# Patient Record
Sex: FEMALE | NOT HISPANIC OR LATINO | URBAN - METROPOLITAN AREA
[De-identification: names, ages, dates, MRNs, and addresses within clinical notes are randomized per-mention and may not be internally consistent; named-entity substitution may affect disease eponyms.]

---

## 2017-01-02 ENCOUNTER — HOSPITAL ENCOUNTER (OUTPATIENT)
Dept: LAB | Facility: CLINIC | Age: 32
End: 2017-01-02
Attending: PHYSICIAN ASSISTANT

## 2017-01-02 ENCOUNTER — TELEPHONE (OUTPATIENT)
Dept: SURGERY | Facility: CLINIC | Age: 32
End: 2017-01-02

## 2017-01-02 NOTE — TELEPHONE ENCOUNTER
Lab at [facility] called stating patient is there for labs and no orders seen.  This writer sees no orders either.  Informed lab that the provider is in with patients and won't be available for a couple of hours.  Apologized - we will contact patient re: labs that may need to be done.  Lab gave message to patient.  Latha Burgess, MS, RD, RN

## 2017-01-03 ENCOUNTER — MYC MEDICAL ADVICE (OUTPATIENT)
Dept: SURGERY | Facility: CLINIC | Age: 32
End: 2017-01-03

## 2017-01-11 ENCOUNTER — OFFICE VISIT (OUTPATIENT)
Dept: SURGERY | Facility: CLINIC | Age: 32
End: 2017-01-11
Payer: COMMERCIAL

## 2017-01-11 DIAGNOSIS — E66.01 MORBID OBESITY, UNSPECIFIED OBESITY TYPE (H): Primary | ICD-10-CM

## 2017-01-11 PROCEDURE — 97803 MED NUTRITION INDIV SUBSEQ: CPT | Performed by: DIETITIAN, REGISTERED

## 2017-01-11 NOTE — PROGRESS NOTES
PRE-SURGICAL WEIGHT LOSS NUTRITION APPOINTMENT  DATE OF VISIT: 2017  Name: YOSEPH CRISTOBAL  : 1985  Gender: Female  MRN: [id number]  Age: 31  ASSESSMENT  REASON FOR VISIT:  YOSEPH CRISTOBAL is a 31 year old Female presents today for a pre-surgical weight loss follow-up appointment.  DIAGNOSIS:  Class III Morbid Obesity.    ANTHROPOMETRICS:  Height: 64.5 inches  Initial Weight: 260 lbs  Weight last visit: 289.7 lbs  Current Weight: 283.0 lbs  BMI: 47.8 kg/m2    NUTRITION HISTORY:  Breakfast: protein shake (24 g protein)-within 1 hour of waking  Lunch: spinach salad with chicken and assorted veggies @ 11:00-2:00   Supper: Chicken noodle soup (when sick with a cold) or chicken, green beans or broccoli @ 6:00-7:00  Snacks: No  Drinks: water, Crystal Light, protein drink  Consuming liquid calories: Protein supplements/shakes  Eating 3 meals per day: Yes  Eating slower: Yes  Chewing foods thoroughly: Yes  Take 30 minutes to consume each meal: Yes  Fluids and meals separate by at least 30 minutes: Yes  Comments: Patient feels like exercise was most helpful with weight loss this past month; patient is confident she can reduce her weight at least one more pound to get to her pre-op weight loss goal  Desired Surgical Procedure: gastric bypass  PHYSICAL ACTIVITY:  Type: Treadmill;weight training  Frequency: 3-4x/week  Duration (min): 40  DIAGNOSIS:  Previous Nutrition Diagnosis: Obesity related to excess energy intake as evidenced by BMI of 49.0  no change  Previous goals:  1. Restart exercise program - go to gym 3x/week for 40 minutes-met  2. Be mindful of portion sizes at lunchtime meals-met  Current Nutrition Diagnosis: Obesity related to excess energy intake as evidenced by BMI of 47.8  IMPLEMENTATION:  Nutrition Prescription: Recommended energy/nutrient modification  Goals:  Continue to practice all prebariatric surgery diet guidelines  Verify that calcium is 600 mg (continue to take BID if 600 mg) Continue  with current exercise regimen   Implementation:  - Discussed progress towards previous goals  - Reinforced importance of making behavior changes in preparation for bariatric surgery.  NUTRITION MONITORING AND EVALUATION:  Anticipated compliance: fair-good  Patient verbalized good understanding of bariatric diet guidelines.  Patient has met prebariatric surgery diet requirements.  Follow up:  # of visits needed: 0  Cleared by RD: Yes  TIME SPENT WITH PATIENT: 23 minutes  Alessandro Olson RD, LD  [facility]  [phone]

## 2017-02-03 ENCOUNTER — TELEPHONE (OUTPATIENT)
Dept: SURGERY | Facility: CLINIC | Age: 32
End: 2017-02-03

## 2017-02-14 ENCOUNTER — OFFICE VISIT (OUTPATIENT)
Dept: SURGERY | Facility: CLINIC | Age: 32
End: 2017-02-14
Payer: COMMERCIAL

## 2017-02-14 VITALS
WEIGHT: 265.2 LBS | SYSTOLIC BLOOD PRESSURE: 134 MMHG | HEART RATE: 87 BPM | BODY MASS INDEX: 44.82 KG/M2 | DIASTOLIC BLOOD PRESSURE: 88 MMHG

## 2017-02-14 PROCEDURE — 99215 OFFICE O/P EST HI 40 MIN: CPT | Performed by: SURGERY

## 2017-02-14 NOTE — PROGRESS NOTES
2017  RE: YOSEPH CRISTOBAL  : 1985             Dear LINDA PAN MD ,       I had the distinct pleasure of meeting with your patient, YOSEPH CRISTOBAL, in the [facility].  As you may know, YOSEPH has been undergoing the thorough preoperative screening process in anticipation of potential bariatric surgery.      At present your patient's BMI is 44.8, and weight is 265  lbs. YOSEPH suffers from multiple medical comorbidities, some of them related to morbid obesity. The comorbidities include High blood pressure, Diabetes type II, Plantar fasciitis, Low back pain, Degenerative disk disease (DDD), Migraine headaches, Obstructive sleep apnea, Skin fold rashes.      YOSEPH has tried multiple attempts at weight loss including Calorie Counting.      PHYSICAL EXAMINATION:    Vital Signs: Ht: 64.5 in, Wt: 265  lbs., BMI: 44.8, BP: 119/78 , Pulse: 85, Temp: 99.1     GENERAL: Alert and oriented x3. NAD  HEENT:  adequate hearing and nasal air movement, Oral dentition adequate for chewing  CARDIOVASCULAR: No JVD  RESPIRATORY: Breathing unlabored  GASTROINTESTINAL: Obese  LOWER EXTREMITIES: no deformities  MUSCULOSKELETAL: Normal gait, Moves all 4 extremities equal and strong  NEUROLOGIC: no gross defect  SKIN: warm and dry to touch      In summary, YOSEPH CRISTOBAL has been evaluated by our medical provider, dietitian and psychologist and appears to be an appropriate candidate for the procedure.      In the office today, I discussed the Laparoscopic Anamaria-en-Y Gastric Bypass as well as the potential risks and benefits. I do feel that some of the related comorbidities can be improved through weight loss, and that surgical weight loss may be the best option. At present we are going to present your patient's file for prior authorization to the insurance provider. Pending prior authorization, I anticipate a surgical date in the near future.      If you have any questions regarding  your patient's care, please do not hesitate to contact me. I look forward to keeping you updated on CRYSTAL's progress through our clinic.      Sincerely,      Dr. Benson Chavez                    Total encopunter time 60 minutes, more than half spent in counseling, review of data and coordination of care

## 2017-02-14 NOTE — LETTER
[facility]          [address]  [address]  [place] [address]                                         Tel:  [phone]  Fax: [phone]        2017    RE: YOSEPH CRISTOBAL  : 1985            Dear LINDA PAN MD ,         I had the distinct pleasure of meeting with your patient, YOSEPH CRISTOBAL, in the [facility].  As you may know, YOSEPH has been undergoing the thorough preoperative screening process in anticipation of potential bariatric surgery.      At present your patient's BMI is 44.8, and weight is 265  lbs. YOSEPH suffers from multiple medical comorbidities, some of them related to morbid obesity. The comorbidities include High blood pressure, Diabetes type II, Plantar fasciitis, Low back pain, Degenerative disk disease (DDD), Migraine headaches, Obstructive sleep apnea, Skin fold rashes.      YOSEPH has tried multiple attempts at weight loss including Calorie Counting.     PHYSICAL EXAMINATION:     Vital Signs: Ht: 64.5 in, Wt: 265  lbs., BMI: 44.8, BP: 119/78 , Pulse: 85, Temp: 99.1      GENERAL: Alert and oriented x3. NAD  HEENT:  adequate hearing and nasal air movement, Oral dentition adequate for chewing  CARDIOVASCULAR: No JVD  RESPIRATORY: Breathing unlabored  GASTROINTESTINAL: Obese  LOWER EXTREMITIES: no deformities  MUSCULOSKELETAL: Normal gait, Moves all 4 extremities equal and strong  NEUROLOGIC: no gross defect  SKIN: warm and dry to touch      In summary, YOSEPH CRISTOBAL has been evaluated by our medical provider, dietitian and psychologist and appears to be an appropriate candidate for the procedure.     In the office today, I discussed the Laparoscopic Anamaria-en-Y Gastric Bypass as well as the potential risks and benefits. I do feel that some of the related comorbidities can be improved through weight loss, and that surgical weight loss may be the best option. At present we are going to present  your patient's file for prior authorization to the insurance provider. Pending prior authorization, I anticipate a surgical date in the near future.      If you have any questions regarding your patient's care, please do not hesitate to contact me. I look forward to keeping you updated on CRYSTAL's progress through our clinic.      Sincerely,      Dr. Benson Chavez

## 2017-02-14 NOTE — MR AVS SNAPSHOT
After Visit Summary   2/14/2017    Saundra Franks    MRN: [id number]           Patient Information     Date Of Birth          1985        Visit Information        Provider Department      2/14/2017 10:30 AM Benson Chavez MD [facility]      Today's Diagnoses     BMI 40.0-44.9, adult (H)    -  1       Follow-ups after your visit        Who to contact     If you have questions or need follow up information about today's clinic visit or your schedule please contact [facility] directly at [phone].  Normal or non-critical lab and imaging results will be communicated to you by MyChart, letter or phone within 4 business days after the clinic has received the results. If you do not hear from us within 7 days, please contact the clinic through [employer] or phone. If you have a critical or abnormal lab result, we will notify you by phone as soon as possible.  Submit refill requests through [employer] or call your pharmacy and they will forward the refill request to us. Please allow 3 business days for your refill to be completed.          Additional Information About Your Visit        MyChart Information     [employer] gives you secure access to your electronic health record. If you see a primary care provider, you can also send messages to your care team and make appointments. If you have questions, please call your primary care clinic.  If you do not have a primary care provider, please call [phone] and they will assist you.        Care EveryWhere ID     This is your Care EveryWhere ID. This could be used by other organizations to access your [facility] medical records  [id number]        Your Vitals Were     Pulse Last Period BMI (Body Mass Index)             87 (LMP Unknown) 44.82 kg/m2          Blood Pressure from Last 3 Encounters:   02/14/17 134/88   12/12/16 114/73   11/15/16 119/78  "   Weight from Last 3 Encounters:   02/14/17 265 lb 3.2 oz (120.3 kg)   12/13/16 289 lb 11.2 oz (131.4 kg)   12/12/16 287 lb (130.2 kg)              We Performed the Following     OP ROOMING NOTE TO AMADO        Primary Care Provider Office Phone # Fax #    Paulo Martin [phone]       [address] [address]        Thank you!     Thank you for choosing [facility]  for your care. Our goal is always to provide you with excellent care. Hearing back from our patients is one way we can continue to improve our services. Please take a few minutes to complete the written survey that you may receive in the mail after your visit with us. Thank you!             Your Updated Medication List - Protect others around you: Learn how to safely use, store and throw away your medicines at www.disposemymeds.org.          This list is accurate as of: 2/14/17  5:03 PM.  Always use your most recent med list.                   Brand Name Dispense Instructions for use    B-D U/F 31G X 8 MM   Generic drug:  insulin pen needle          DULoxetine 60 MG EC capsule    CYMBALTA     60 mg daily       FROVA 2.5 MG tablet   Generic drug:  frovatriptan      Take 2.5 mg by mouth       gabapentin 400 MG capsule    NEURONTIN     400 mg 3 times daily       ibuprofen 800 MG tablet    ADVIL/MOTRIN    60 tablet    Take 1 tablet (800 mg) by mouth every 8 hours as needed for moderate pain       insulin syringe-needle U-100 30G X 5/16\" 1 ML          losartan 50 MG tablet    COZAAR     100 mg daily       metFORMIN 1000 MG tablet    GLUCOPHAGE     1,000 mg 2 times daily (with meals)       Multi-vitamin Tabs tablet      Take 2 tablets by mouth daily Herbalife       NovoLOG MIX 70/30 FLEXPEN injection   Generic drug:  insulin aspart prot & aspart      Inject 63 Units Subcutaneous 2 times daily (with meals)       ONE TOUCH LANCETS Misc          ONE TOUCH ULTRA test " strip   Generic drug:  blood glucose monitoring          verapamil 120 MG 24 hr capsule    VERELAN     Take 120 mg by mouth       XANAX PO      Take 0.5 mg by mouth as needed for anxiety

## 2017-03-23 ENCOUNTER — APPOINTMENT (OUTPATIENT)
Dept: SURGERY | Facility: PHYSICIAN GROUP | Age: 32
End: 2017-03-23
Payer: COMMERCIAL

## 2017-03-23 ENCOUNTER — ANESTHESIA EVENT (OUTPATIENT)
Dept: SURGERY | Facility: CLINIC | Age: 32
DRG: 621 | End: 2017-03-23
Payer: COMMERCIAL

## 2017-03-23 ENCOUNTER — HOSPITAL ENCOUNTER (INPATIENT)
Facility: CLINIC | Age: 32
LOS: 3 days | Discharge: HOME | DRG: 621 | End: 2017-03-26
Attending: SURGERY | Admitting: SURGERY
Payer: COMMERCIAL

## 2017-03-23 ENCOUNTER — ANESTHESIA (OUTPATIENT)
Dept: SURGERY | Facility: CLINIC | Age: 32
DRG: 621 | End: 2017-03-23
Payer: COMMERCIAL

## 2017-03-23 DIAGNOSIS — Z98.84 BARIATRIC SURGERY STATUS: ICD-10-CM

## 2017-03-23 DIAGNOSIS — G89.18 ACUTE POST-OPERATIVE PAIN: ICD-10-CM

## 2017-03-23 DIAGNOSIS — E66.01 MORBID OBESITY DUE TO EXCESS CALORIES (H): Primary | ICD-10-CM

## 2017-03-23 DIAGNOSIS — R11.2 PONV (POSTOPERATIVE NAUSEA AND VOMITING): ICD-10-CM

## 2017-03-23 DIAGNOSIS — Z98.890 PONV (POSTOPERATIVE NAUSEA AND VOMITING): ICD-10-CM

## 2017-03-23 LAB
CREAT SERPL-MCNC: 0.71 MG/DL (ref 0.52–1.04)
CREAT SERPL-MCNC: 0.77 MG/DL (ref 0.52–1.04)
GFR SERPL CREATININE-BSD FRML MDRD: 87 ML/MIN/1.7M2
GFR SERPL CREATININE-BSD FRML MDRD: NORMAL ML/MIN/1.7M2
GLUCOSE BLDC GLUCOMTR-MCNC: 166 MG/DL (ref 70–99)
GLUCOSE BLDC GLUCOMTR-MCNC: 170 MG/DL (ref 70–99)
GLUCOSE BLDC GLUCOMTR-MCNC: 175 MG/DL (ref 70–99)
GLUCOSE SERPL-MCNC: 152 MG/DL (ref 70–99)
HCG SERPL QL: NEGATIVE
PLATELET # BLD AUTO: 279 10E9/L (ref 150–450)
POTASSIUM SERPL-SCNC: 4 MMOL/L (ref 3.4–5.3)

## 2017-03-23 PROCEDURE — 00000146 ZZHCL STATISTIC GLUCOSE BY METER IP

## 2017-03-23 PROCEDURE — 25000128 H RX IP 250 OP 636: Performed by: NURSE ANESTHETIST, CERTIFIED REGISTERED

## 2017-03-23 PROCEDURE — 25800025 ZZH RX 258: Performed by: PHYSICIAN ASSISTANT

## 2017-03-23 PROCEDURE — 85049 AUTOMATED PLATELET COUNT: CPT | Performed by: PHYSICIAN ASSISTANT

## 2017-03-23 PROCEDURE — 43644 LAP GASTRIC BYPASS/ROUX-EN-Y: CPT | Mod: AS | Performed by: PHYSICIAN ASSISTANT

## 2017-03-23 PROCEDURE — 0D164ZA BYPASS STOMACH TO JEJUNUM, PERCUTANEOUS ENDOSCOPIC APPROACH: ICD-10-PCS | Performed by: SURGERY

## 2017-03-23 PROCEDURE — 25000564 ZZH DESFLURANE, EA 15 MIN: Performed by: SURGERY

## 2017-03-23 PROCEDURE — 25000128 H RX IP 250 OP 636: Performed by: PHYSICIAN ASSISTANT

## 2017-03-23 PROCEDURE — 25000128 H RX IP 250 OP 636: Performed by: ANESTHESIOLOGY

## 2017-03-23 PROCEDURE — 84703 CHORIONIC GONADOTROPIN ASSAY: CPT | Performed by: ANESTHESIOLOGY

## 2017-03-23 PROCEDURE — 82565 ASSAY OF CREATININE: CPT | Performed by: PHYSICIAN ASSISTANT

## 2017-03-23 PROCEDURE — 82565 ASSAY OF CREATININE: CPT | Performed by: ANESTHESIOLOGY

## 2017-03-23 PROCEDURE — 27210995 ZZH RX 272: Performed by: SURGERY

## 2017-03-23 PROCEDURE — 82947 ASSAY GLUCOSE BLOOD QUANT: CPT | Performed by: ANESTHESIOLOGY

## 2017-03-23 PROCEDURE — 36415 COLL VENOUS BLD VENIPUNCTURE: CPT | Performed by: PHYSICIAN ASSISTANT

## 2017-03-23 PROCEDURE — 43644 LAP GASTRIC BYPASS/ROUX-EN-Y: CPT | Performed by: SURGERY

## 2017-03-23 PROCEDURE — 25800025 ZZH RX 258: Performed by: ANESTHESIOLOGY

## 2017-03-23 PROCEDURE — 25000125 ZZHC RX 250: Performed by: ANESTHESIOLOGY

## 2017-03-23 PROCEDURE — 25000125 ZZHC RX 250: Performed by: SURGERY

## 2017-03-23 PROCEDURE — 25800025 ZZH RX 258: Performed by: SURGERY

## 2017-03-23 PROCEDURE — 36000063 ZZH SURGERY LEVEL 4 EA 15 ADDTL MIN: Performed by: SURGERY

## 2017-03-23 PROCEDURE — 27211024 ZZHC OR SUPPLY OTHER OPNP: Performed by: SURGERY

## 2017-03-23 PROCEDURE — 71000012 ZZH RECOVERY PHASE 1 LEVEL 1 FIRST HR: Performed by: SURGERY

## 2017-03-23 PROCEDURE — 37000008 ZZH ANESTHESIA TECHNICAL FEE, 1ST 30 MIN: Performed by: SURGERY

## 2017-03-23 PROCEDURE — 27210794 ZZH OR GENERAL SUPPLY STERILE: Performed by: SURGERY

## 2017-03-23 PROCEDURE — 25000128 H RX IP 250 OP 636: Performed by: SURGERY

## 2017-03-23 PROCEDURE — 12000007 ZZH R&B INTERMEDIATE

## 2017-03-23 PROCEDURE — 37000009 ZZH ANESTHESIA TECHNICAL FEE, EACH ADDTL 15 MIN: Performed by: SURGERY

## 2017-03-23 PROCEDURE — 25000131 ZZH RX MED GY IP 250 OP 636 PS 637: Performed by: PHYSICIAN ASSISTANT

## 2017-03-23 PROCEDURE — 40000169 ZZH STATISTIC PRE-PROCEDURE ASSESSMENT I: Performed by: SURGERY

## 2017-03-23 PROCEDURE — 25000125 ZZHC RX 250: Performed by: NURSE ANESTHETIST, CERTIFIED REGISTERED

## 2017-03-23 PROCEDURE — 36415 COLL VENOUS BLD VENIPUNCTURE: CPT | Performed by: ANESTHESIOLOGY

## 2017-03-23 PROCEDURE — 84132 ASSAY OF SERUM POTASSIUM: CPT | Performed by: ANESTHESIOLOGY

## 2017-03-23 PROCEDURE — 36000093 ZZH SURGERY LEVEL 4 1ST 30 MIN: Performed by: SURGERY

## 2017-03-23 PROCEDURE — 71000013 ZZH RECOVERY PHASE 1 LEVEL 1 EA ADDTL HR: Performed by: SURGERY

## 2017-03-23 RX ORDER — HEPARIN SODIUM 5000 [USP'U]/.5ML
5000 INJECTION, SOLUTION INTRAVENOUS; SUBCUTANEOUS
Status: COMPLETED | OUTPATIENT
Start: 2017-03-23 | End: 2017-03-23

## 2017-03-23 RX ORDER — DEXTROSE MONOHYDRATE 25 G/50ML
25-50 INJECTION, SOLUTION INTRAVENOUS
Status: DISCONTINUED | OUTPATIENT
Start: 2017-03-23 | End: 2017-03-26 | Stop reason: HOSPADM

## 2017-03-23 RX ORDER — PROPOFOL 10 MG/ML
INJECTION, EMULSION INTRAVENOUS CONTINUOUS PRN
Status: DISCONTINUED | OUTPATIENT
Start: 2017-03-23 | End: 2017-03-23

## 2017-03-23 RX ORDER — BUSPIRONE HYDROCHLORIDE 10 MG/1
10 TABLET ORAL 2 TIMES DAILY
Status: DISCONTINUED | OUTPATIENT
Start: 2017-03-23 | End: 2017-03-23

## 2017-03-23 RX ORDER — GLYCOPYRROLATE 0.2 MG/ML
INJECTION, SOLUTION INTRAMUSCULAR; INTRAVENOUS PRN
Status: DISCONTINUED | OUTPATIENT
Start: 2017-03-23 | End: 2017-03-23

## 2017-03-23 RX ORDER — MAGNESIUM HYDROXIDE 1200 MG/15ML
LIQUID ORAL PRN
Status: DISCONTINUED | OUTPATIENT
Start: 2017-03-23 | End: 2017-03-23 | Stop reason: HOSPADM

## 2017-03-23 RX ORDER — LOSARTAN POTASSIUM 100 MG/1
100 TABLET ORAL EVERY EVENING
Status: DISCONTINUED | OUTPATIENT
Start: 2017-03-24 | End: 2017-03-26 | Stop reason: HOSPADM

## 2017-03-23 RX ORDER — ACETAMINOPHEN 325 MG/1
975 TABLET ORAL EVERY 8 HOURS
Status: DISCONTINUED | OUTPATIENT
Start: 2017-03-24 | End: 2017-03-26 | Stop reason: HOSPADM

## 2017-03-23 RX ORDER — HYDROXYZINE HCL 10 MG/5 ML
25 SOLUTION, ORAL ORAL EVERY 6 HOURS PRN
Status: DISCONTINUED | OUTPATIENT
Start: 2017-03-23 | End: 2017-03-26 | Stop reason: HOSPADM

## 2017-03-23 RX ORDER — ONDANSETRON 2 MG/ML
4 INJECTION INTRAMUSCULAR; INTRAVENOUS EVERY 6 HOURS PRN
Status: DISCONTINUED | OUTPATIENT
Start: 2017-03-23 | End: 2017-03-26 | Stop reason: HOSPADM

## 2017-03-23 RX ORDER — PROCHLORPERAZINE MALEATE 5 MG
5-10 TABLET ORAL EVERY 6 HOURS PRN
Status: DISCONTINUED | OUTPATIENT
Start: 2017-03-23 | End: 2017-03-26 | Stop reason: HOSPADM

## 2017-03-23 RX ORDER — SUMATRIPTAN 50 MG/1
100 TABLET, FILM COATED ORAL DAILY PRN
Status: DISCONTINUED | OUTPATIENT
Start: 2017-03-23 | End: 2017-03-26 | Stop reason: HOSPADM

## 2017-03-23 RX ORDER — CEFAZOLIN SODIUM 1 G/50ML
3 SOLUTION INTRAVENOUS
Status: COMPLETED | OUTPATIENT
Start: 2017-03-23 | End: 2017-03-23

## 2017-03-23 RX ORDER — FENTANYL CITRATE 50 UG/ML
INJECTION, SOLUTION INTRAMUSCULAR; INTRAVENOUS PRN
Status: DISCONTINUED | OUTPATIENT
Start: 2017-03-23 | End: 2017-03-23

## 2017-03-23 RX ORDER — SODIUM CHLORIDE, SODIUM LACTATE, POTASSIUM CHLORIDE, CALCIUM CHLORIDE 600; 310; 30; 20 MG/100ML; MG/100ML; MG/100ML; MG/100ML
INJECTION, SOLUTION INTRAVENOUS CONTINUOUS
Status: DISCONTINUED | OUTPATIENT
Start: 2017-03-23 | End: 2017-03-25

## 2017-03-23 RX ORDER — NEOSTIGMINE METHYLSULFATE 1 MG/ML
VIAL (ML) INJECTION PRN
Status: DISCONTINUED | OUTPATIENT
Start: 2017-03-23 | End: 2017-03-23

## 2017-03-23 RX ORDER — FENTANYL CITRATE 0.05 MG/ML
25-50 INJECTION, SOLUTION INTRAMUSCULAR; INTRAVENOUS
Status: DISCONTINUED | OUTPATIENT
Start: 2017-03-23 | End: 2017-03-23 | Stop reason: HOSPADM

## 2017-03-23 RX ORDER — PROPOFOL 10 MG/ML
INJECTION, EMULSION INTRAVENOUS PRN
Status: DISCONTINUED | OUTPATIENT
Start: 2017-03-23 | End: 2017-03-23

## 2017-03-23 RX ORDER — HYDROXYZINE HYDROCHLORIDE 50 MG/ML
25-50 INJECTION, SOLUTION INTRAMUSCULAR EVERY 6 HOURS PRN
Status: DISCONTINUED | OUTPATIENT
Start: 2017-03-23 | End: 2017-03-26 | Stop reason: HOSPADM

## 2017-03-23 RX ORDER — GABAPENTIN 100 MG/1
400 CAPSULE ORAL 3 TIMES DAILY
Status: DISCONTINUED | OUTPATIENT
Start: 2017-03-24 | End: 2017-03-26 | Stop reason: HOSPADM

## 2017-03-23 RX ORDER — SODIUM CHLORIDE, SODIUM LACTATE, POTASSIUM CHLORIDE, CALCIUM CHLORIDE 600; 310; 30; 20 MG/100ML; MG/100ML; MG/100ML; MG/100ML
INJECTION, SOLUTION INTRAVENOUS CONTINUOUS
Status: DISCONTINUED | OUTPATIENT
Start: 2017-03-23 | End: 2017-03-23 | Stop reason: HOSPADM

## 2017-03-23 RX ORDER — VECURONIUM BROMIDE 1 MG/ML
INJECTION, POWDER, LYOPHILIZED, FOR SOLUTION INTRAVENOUS PRN
Status: DISCONTINUED | OUTPATIENT
Start: 2017-03-23 | End: 2017-03-23

## 2017-03-23 RX ORDER — BUSPIRONE HYDROCHLORIDE 10 MG/1
10 TABLET ORAL 2 TIMES DAILY
Status: DISCONTINUED | OUTPATIENT
Start: 2017-03-24 | End: 2017-03-26 | Stop reason: HOSPADM

## 2017-03-23 RX ORDER — ONDANSETRON 2 MG/ML
4 INJECTION INTRAMUSCULAR; INTRAVENOUS EVERY 30 MIN PRN
Status: DISCONTINUED | OUTPATIENT
Start: 2017-03-23 | End: 2017-03-23 | Stop reason: HOSPADM

## 2017-03-23 RX ORDER — ONDANSETRON 4 MG/1
4 TABLET, ORALLY DISINTEGRATING ORAL EVERY 30 MIN PRN
Status: DISCONTINUED | OUTPATIENT
Start: 2017-03-23 | End: 2017-03-23 | Stop reason: HOSPADM

## 2017-03-23 RX ORDER — OXYCODONE HCL 5 MG/5 ML
5-10 SOLUTION, ORAL ORAL
Status: DISCONTINUED | OUTPATIENT
Start: 2017-03-23 | End: 2017-03-26 | Stop reason: HOSPADM

## 2017-03-23 RX ORDER — ACETAMINOPHEN 325 MG/1
650 TABLET ORAL EVERY 4 HOURS PRN
Status: DISCONTINUED | OUTPATIENT
Start: 2017-03-27 | End: 2017-03-26 | Stop reason: HOSPADM

## 2017-03-23 RX ORDER — NICOTINE POLACRILEX 4 MG
15-30 LOZENGE BUCCAL
Status: DISCONTINUED | OUTPATIENT
Start: 2017-03-23 | End: 2017-03-26 | Stop reason: HOSPADM

## 2017-03-23 RX ORDER — LABETALOL HYDROCHLORIDE 5 MG/ML
INJECTION, SOLUTION INTRAVENOUS PRN
Status: DISCONTINUED | OUTPATIENT
Start: 2017-03-23 | End: 2017-03-23

## 2017-03-23 RX ORDER — DIPHENHYDRAMINE HCL 25 MG
25 CAPSULE ORAL EVERY 6 HOURS PRN
Status: DISCONTINUED | OUTPATIENT
Start: 2017-03-23 | End: 2017-03-24

## 2017-03-23 RX ORDER — LIDOCAINE 40 MG/G
CREAM TOPICAL
Status: DISCONTINUED | OUTPATIENT
Start: 2017-03-23 | End: 2017-03-26 | Stop reason: HOSPADM

## 2017-03-23 RX ORDER — DIPHENHYDRAMINE HYDROCHLORIDE 50 MG/ML
25 INJECTION INTRAMUSCULAR; INTRAVENOUS EVERY 6 HOURS PRN
Status: DISCONTINUED | OUTPATIENT
Start: 2017-03-23 | End: 2017-03-24

## 2017-03-23 RX ORDER — DULOXETIN HYDROCHLORIDE 20 MG/1
60 CAPSULE, DELAYED RELEASE ORAL DAILY
Status: DISCONTINUED | OUTPATIENT
Start: 2017-03-24 | End: 2017-03-26 | Stop reason: HOSPADM

## 2017-03-23 RX ORDER — CEFAZOLIN SODIUM 2 G/100ML
2 INJECTION, SOLUTION INTRAVENOUS EVERY 8 HOURS
Status: COMPLETED | OUTPATIENT
Start: 2017-03-23 | End: 2017-03-24

## 2017-03-23 RX ORDER — LABETALOL HYDROCHLORIDE 5 MG/ML
10 INJECTION, SOLUTION INTRAVENOUS ONCE
Status: COMPLETED | OUTPATIENT
Start: 2017-03-23 | End: 2017-03-23

## 2017-03-23 RX ORDER — ONDANSETRON 2 MG/ML
INJECTION INTRAMUSCULAR; INTRAVENOUS PRN
Status: DISCONTINUED | OUTPATIENT
Start: 2017-03-23 | End: 2017-03-23

## 2017-03-23 RX ORDER — ONDANSETRON 4 MG/1
4 TABLET, ORALLY DISINTEGRATING ORAL EVERY 6 HOURS PRN
Status: DISCONTINUED | OUTPATIENT
Start: 2017-03-23 | End: 2017-03-26 | Stop reason: HOSPADM

## 2017-03-23 RX ORDER — LIDOCAINE HYDROCHLORIDE 20 MG/ML
INJECTION, SOLUTION INFILTRATION; PERINEURAL PRN
Status: DISCONTINUED | OUTPATIENT
Start: 2017-03-23 | End: 2017-03-23

## 2017-03-23 RX ORDER — NALOXONE HYDROCHLORIDE 0.4 MG/ML
.1-.4 INJECTION, SOLUTION INTRAMUSCULAR; INTRAVENOUS; SUBCUTANEOUS
Status: DISCONTINUED | OUTPATIENT
Start: 2017-03-23 | End: 2017-03-24

## 2017-03-23 RX ORDER — DEXAMETHASONE SODIUM PHOSPHATE 4 MG/ML
INJECTION, SOLUTION INTRA-ARTICULAR; INTRALESIONAL; INTRAMUSCULAR; INTRAVENOUS; SOFT TISSUE PRN
Status: DISCONTINUED | OUTPATIENT
Start: 2017-03-23 | End: 2017-03-23

## 2017-03-23 RX ORDER — KETOROLAC TROMETHAMINE 30 MG/ML
30 INJECTION, SOLUTION INTRAMUSCULAR; INTRAVENOUS EVERY 6 HOURS
Status: DISPENSED | OUTPATIENT
Start: 2017-03-23 | End: 2017-03-24

## 2017-03-23 RX ADMIN — VECURONIUM BROMIDE 2 MG: 1 INJECTION, POWDER, LYOPHILIZED, FOR SOLUTION INTRAVENOUS at 12:13

## 2017-03-23 RX ADMIN — LIDOCAINE HYDROCHLORIDE 1 ML: 10 INJECTION, SOLUTION EPIDURAL; INFILTRATION; INTRACAUDAL; PERINEURAL at 09:41

## 2017-03-23 RX ADMIN — FENTANYL CITRATE 150 MCG: 50 INJECTION, SOLUTION INTRAMUSCULAR; INTRAVENOUS at 10:15

## 2017-03-23 RX ADMIN — VECURONIUM BROMIDE 2 MG: 1 INJECTION, POWDER, LYOPHILIZED, FOR SOLUTION INTRAVENOUS at 10:42

## 2017-03-23 RX ADMIN — VECURONIUM BROMIDE 2 MG: 1 INJECTION, POWDER, LYOPHILIZED, FOR SOLUTION INTRAVENOUS at 11:08

## 2017-03-23 RX ADMIN — LABETALOL HYDROCHLORIDE 10 MG: 5 INJECTION, SOLUTION INTRAVENOUS at 15:25

## 2017-03-23 RX ADMIN — ROCURONIUM BROMIDE 50 MG: 10 INJECTION INTRAVENOUS at 10:19

## 2017-03-23 RX ADMIN — VECURONIUM BROMIDE 1 MG: 1 INJECTION, POWDER, LYOPHILIZED, FOR SOLUTION INTRAVENOUS at 11:43

## 2017-03-23 RX ADMIN — LIDOCAINE HYDROCHLORIDE 80 MG: 20 INJECTION, SOLUTION INFILTRATION; PERINEURAL at 10:17

## 2017-03-23 RX ADMIN — LABETALOL HYDROCHLORIDE 5 MG: 5 INJECTION, SOLUTION INTRAVENOUS at 12:01

## 2017-03-23 RX ADMIN — FENTANYL CITRATE 50 MCG: 50 INJECTION, SOLUTION INTRAMUSCULAR; INTRAVENOUS at 10:48

## 2017-03-23 RX ADMIN — LABETALOL HYDROCHLORIDE 5 MG: 5 INJECTION, SOLUTION INTRAVENOUS at 11:53

## 2017-03-23 RX ADMIN — DEXMEDETOMIDINE 4 MCG: 100 INJECTION, SOLUTION, CONCENTRATE INTRAVENOUS at 13:18

## 2017-03-23 RX ADMIN — VECURONIUM BROMIDE 2 MG: 1 INJECTION, POWDER, LYOPHILIZED, FOR SOLUTION INTRAVENOUS at 13:13

## 2017-03-23 RX ADMIN — FENTANYL CITRATE 50 MCG: 50 INJECTION, SOLUTION INTRAMUSCULAR; INTRAVENOUS at 14:22

## 2017-03-23 RX ADMIN — MIDAZOLAM HYDROCHLORIDE 2 MG: 1 INJECTION, SOLUTION INTRAMUSCULAR; INTRAVENOUS at 10:13

## 2017-03-23 RX ADMIN — ONDANSETRON 4 MG: 2 INJECTION INTRAMUSCULAR; INTRAVENOUS at 13:33

## 2017-03-23 RX ADMIN — DIPHENHYDRAMINE HYDROCHLORIDE 25 MG: 50 INJECTION, SOLUTION INTRAMUSCULAR; INTRAVENOUS at 18:25

## 2017-03-23 RX ADMIN — HYDROMORPHONE HYDROCHLORIDE 0.5 MG: 1 INJECTION, SOLUTION INTRAMUSCULAR; INTRAVENOUS; SUBCUTANEOUS at 12:20

## 2017-03-23 RX ADMIN — Medication 3 G: at 10:33

## 2017-03-23 RX ADMIN — FENTANYL CITRATE 50 MCG: 50 INJECTION, SOLUTION INTRAMUSCULAR; INTRAVENOUS at 10:58

## 2017-03-23 RX ADMIN — DEXMEDETOMIDINE 8 MCG: 100 INJECTION, SOLUTION, CONCENTRATE INTRAVENOUS at 11:11

## 2017-03-23 RX ADMIN — CEFAZOLIN SODIUM 2 G: 2 INJECTION, SOLUTION INTRAVENOUS at 21:27

## 2017-03-23 RX ADMIN — DEXMEDETOMIDINE 8 MCG: 100 INJECTION, SOLUTION, CONCENTRATE INTRAVENOUS at 12:46

## 2017-03-23 RX ADMIN — PROPOFOL 30 MG: 10 INJECTION, EMULSION INTRAVENOUS at 11:41

## 2017-03-23 RX ADMIN — PROPOFOL 30 MG: 10 INJECTION, EMULSION INTRAVENOUS at 11:26

## 2017-03-23 RX ADMIN — DEXMEDETOMIDINE 8 MCG: 100 INJECTION, SOLUTION, CONCENTRATE INTRAVENOUS at 11:26

## 2017-03-23 RX ADMIN — INSULIN ASPART 1 UNITS: 100 INJECTION, SOLUTION INTRAVENOUS; SUBCUTANEOUS at 22:32

## 2017-03-23 RX ADMIN — SODIUM CHLORIDE, POTASSIUM CHLORIDE, SODIUM LACTATE AND CALCIUM CHLORIDE: 600; 310; 30; 20 INJECTION, SOLUTION INTRAVENOUS at 11:42

## 2017-03-23 RX ADMIN — SODIUM CHLORIDE, POTASSIUM CHLORIDE, SODIUM LACTATE AND CALCIUM CHLORIDE: 600; 310; 30; 20 INJECTION, SOLUTION INTRAVENOUS at 10:58

## 2017-03-23 RX ADMIN — PHENYLEPHRINE HYDROCHLORIDE 0.25 MCG/KG/MIN: 10 INJECTION, SOLUTION INTRAMUSCULAR; INTRAVENOUS; SUBCUTANEOUS at 10:25

## 2017-03-23 RX ADMIN — VECURONIUM BROMIDE 2 MG: 1 INJECTION, POWDER, LYOPHILIZED, FOR SOLUTION INTRAVENOUS at 12:34

## 2017-03-23 RX ADMIN — VECURONIUM BROMIDE 1 MG: 1 INJECTION, POWDER, LYOPHILIZED, FOR SOLUTION INTRAVENOUS at 11:26

## 2017-03-23 RX ADMIN — HYDROMORPHONE HYDROCHLORIDE 0.5 MG: 1 INJECTION, SOLUTION INTRAMUSCULAR; INTRAVENOUS; SUBCUTANEOUS at 14:56

## 2017-03-23 RX ADMIN — HYDROMORPHONE HYDROCHLORIDE 30 MG: 10 INJECTION, SOLUTION INTRAMUSCULAR; INTRAVENOUS; SUBCUTANEOUS at 14:54

## 2017-03-23 RX ADMIN — NEOSTIGMINE METHYLSULFATE 5 MG: 1 INJECTION INTRAMUSCULAR; INTRAVENOUS; SUBCUTANEOUS at 13:41

## 2017-03-23 RX ADMIN — DEXMEDETOMIDINE 8 MCG: 100 INJECTION, SOLUTION, CONCENTRATE INTRAVENOUS at 13:00

## 2017-03-23 RX ADMIN — SODIUM CHLORIDE, POTASSIUM CHLORIDE, SODIUM LACTATE AND CALCIUM CHLORIDE: 600; 310; 30; 20 INJECTION, SOLUTION INTRAVENOUS at 13:43

## 2017-03-23 RX ADMIN — PROPOFOL 50 MCG/KG/MIN: 10 INJECTION, EMULSION INTRAVENOUS at 11:24

## 2017-03-23 RX ADMIN — KETOROLAC TROMETHAMINE 30 MG: 30 INJECTION, SOLUTION INTRAMUSCULAR at 15:13

## 2017-03-23 RX ADMIN — HYDROXYZINE HYDROCHLORIDE 50 MG: 50 INJECTION, SOLUTION INTRAMUSCULAR at 22:48

## 2017-03-23 RX ADMIN — HYDROMORPHONE HYDROCHLORIDE 0.5 MG: 1 INJECTION, SOLUTION INTRAMUSCULAR; INTRAVENOUS; SUBCUTANEOUS at 11:08

## 2017-03-23 RX ADMIN — HYDROMORPHONE HYDROCHLORIDE 0.5 MG: 1 INJECTION, SOLUTION INTRAMUSCULAR; INTRAVENOUS; SUBCUTANEOUS at 11:47

## 2017-03-23 RX ADMIN — PROPOFOL 40 MG: 10 INJECTION, EMULSION INTRAVENOUS at 11:11

## 2017-03-23 RX ADMIN — KETOROLAC TROMETHAMINE 30 MG: 30 INJECTION, SOLUTION INTRAMUSCULAR at 21:27

## 2017-03-23 RX ADMIN — SODIUM CHLORIDE, POTASSIUM CHLORIDE, SODIUM LACTATE AND CALCIUM CHLORIDE: 600; 310; 30; 20 INJECTION, SOLUTION INTRAVENOUS at 17:43

## 2017-03-23 RX ADMIN — SODIUM CHLORIDE, POTASSIUM CHLORIDE, SODIUM LACTATE AND CALCIUM CHLORIDE: 600; 310; 30; 20 INJECTION, SOLUTION INTRAVENOUS at 09:40

## 2017-03-23 RX ADMIN — Medication 1 G: at 12:39

## 2017-03-23 RX ADMIN — FENTANYL CITRATE 50 MCG: 50 INJECTION, SOLUTION INTRAMUSCULAR; INTRAVENOUS at 14:33

## 2017-03-23 RX ADMIN — GLYCOPYRROLATE 1 MG: 0.2 INJECTION, SOLUTION INTRAMUSCULAR; INTRAVENOUS at 13:41

## 2017-03-23 RX ADMIN — PROPOFOL 200 MG: 10 INJECTION, EMULSION INTRAVENOUS at 10:18

## 2017-03-23 RX ADMIN — HEPARIN SODIUM 5000 UNITS: 10000 INJECTION, SOLUTION INTRAVENOUS; SUBCUTANEOUS at 10:26

## 2017-03-23 RX ADMIN — DEXAMETHASONE SODIUM PHOSPHATE 4 MG: 4 INJECTION, SOLUTION INTRAMUSCULAR; INTRAVENOUS at 10:33

## 2017-03-23 RX ADMIN — HYDROMORPHONE HYDROCHLORIDE 0.5 MG: 1 INJECTION, SOLUTION INTRAMUSCULAR; INTRAVENOUS; SUBCUTANEOUS at 11:00

## 2017-03-23 RX ADMIN — HYDROMORPHONE HYDROCHLORIDE 0.5 MG: 1 INJECTION, SOLUTION INTRAMUSCULAR; INTRAVENOUS; SUBCUTANEOUS at 14:43

## 2017-03-23 ASSESSMENT — ENCOUNTER SYMPTOMS: SEIZURES: 1

## 2017-03-23 ASSESSMENT — LIFESTYLE VARIABLES: TOBACCO_USE: 1

## 2017-03-23 NOTE — BRIEF OP NOTE
[facility] Brief Operative Note    Pre-operative diagnosis: MORBID OBESITY    Post-operative diagnosis Morbid Obesity     Procedure: Procedure(s):  Laparoscopic Gastric Bypass with   lysis of adhesions - Wound Class: I-Clean   - Wound Class: II-Clean Contaminated   Surgeon(s): Surgeon(s) and Role:     * Benson Chavez MD - Primary     * Kole Butcher PA-C - Assisting   Estimated blood loss: 75 mL    Specimens: * No specimens in log *   Findings: See Operative Report for full details

## 2017-03-23 NOTE — OP NOTE
Surgeon: Benson Chavez MD   1 st Assistant: Kole Butcher PA-C, A Physician Assisstant was necessary for expertise, exposure and surgical assistance throughout the case.    PREOPERATIVE DIAGNOSIS:   Morbid Obesity  POSTOPERATIVE DIAGNOSIS:   Same    PROCEDURE:   1.  Laparoscopic Gastric Bypass with Anamaria-en-Y Gastrojejunostomy ( 100 cm anamaria limb)  2.  Lysis of Adhesions  ANESTHESIA:   General Endotracheal Anesthesia   COMPLICATIONS:   none   EBL:   75 ml   CONSENT:   Saundra Franks was seen and evaluated in the office setting where the procedure was explained to the patient and all questions were answered. An informed consent discussion was held with the patient. Viable alternatives to the proposed procedure, including but not limited to, medical observation under the care of a physician, exercise programs and other weight reductive operative procedures were explained to the patient.  Risks to the proposed procedure, including but not limited to bleeding, infection, conversion to open, anastomotic disruption, bile leak, injury to bile ducts, pneumonia, pulmonary embolus, airway complications and death were explained to the patient. The patient understands the above alternatives and risks and has chosen laparoscopic gastric bypass with Anamaria-en-Y reconstruction and wishes to proceed.  Events:   The patient was taken to the operating room and placed in the supine position. After successful induction of general endotracheal anesthesia, a wilson catheter and orogastric tube was placed to decompress the bladder and the stomach, respectively. The patient was placed in the Lithotomy position, supine with legs abducted to 30 degrees. Care was taken to pad the exposed extremities. The patient's abdomen was prepped and draped in the normal sterile fashion. A direct visualization trocar was placed in the left subcostal position in the midclavicular line and access to abdominal cavity was obtained under visualization.  Pneumoperitoneum was then established without complications. After evaluation of the abdominal contents from this trocar position, four other 12-mm ports were placed under direct visualization. A solution of local anesthetic was used to infiltrate the subcutaneous tissues prior to trocar placement.   We turned our attention to dividing the greater omentum, before this could be done adhesion from the omentum to the abdominal wall needed to be taken down. This was accomplished with the harmonic scalpel. After this, we identified the ligament of Treitz and went 45 to 50-cm distal to that. A 3-0 suture was then place to julien the proximal bowel and then we divided the bowel using a 45-mm NORBERTO stapler. We then measured 100-cm distal on the bowel and oriented our bowel to create the jejunojejunostomy. An enterotomy was created in each limb with the harmonic scalpel and using the triple-staple technique created a side-to-side jejunojejunostomy. The opening was evaluated for hemostasis and care was taken to make sure that the posterior wall of the anastomosis was free. The remaining defect was closed with one more firing of the NORBERTO stapler.  Mesenteric defect closed with silk suture.  Left lobe of the liver was retracted with a Blanca device on the sub-xyphoid location.  Multiple adhesions to the live from the stomach needed to the removed.  After decompression of the stomach with the orogastric tube, the orogastric tube and any esophageal probes were removed from the patient. The stomach was grasped using forceps and retracted caudally to expose the phrenoesophageal ligament. This was taken down with the hook electrocautery. A blunt palpation probe was used to expose the esophagus and the left bundle of the esophageal abril. At this point we released our retraction on the stomach and measured seven centimeters from the Angle of His along the lesser curvature. The Nerve of Latarjet was identified and preserved. We made a  window along the lesser curvature with the Harmonic Scalpel and entered the lesser sac.  This dissection was arduous due to some retrogastric adhesions, we encounter bleeding that required, clip application, harmonic scalple application, and cautery. Once the lesser sac was entered, a NORBERTO staple loads were used to progress cephalad toward the Angle of His. A small gastric pouch was created, approximately 25-30 ml in size. After this was done, the anvil for the 25 mm EEA stapler was placed transorally. The orogastric tube with the anvil was seen in the gastric pouch. The harmonic scalpel was used to make a gastrotomy on the pouch thru the staple line, and the orogastric tube was pulled out of the gastric pouch. The orogastric tube was detached from the anvil and removed leaving the anvil in the gastric pouch.   After this was done we brought the efferent limb all the way uand  p to the gastric pouch, in between our previously created defect in the greater omentum. The staple line on the jejunum was opened using the harmonic scalpel and after enlarging the lateral trocar site, the 25-mm EEA stapler was introduced into the abdomen and into the open jejunum. Using the EEA stapler, we performed our end to side gastrojejunostomy. The stapler was removed and two healthy donuts of tissue were identified. The open-ended jejunum was closed using a NORBERTO stapler. This amputated piece of jejunum was placed into an endopouch and removed from the abdomen.   Having completed our gastrojejunostomy, an orogastric tube was placed by anesthesia and the integrity of the anastomosis was checked. Saline was used to irrigate the pouch to evaluate for hemostasis, and after that, the pouch was insufflated with air and methylene blue. There was no evidence of bleeding, air leak nor liquid leak.   Evicel was applied at the staple lines and to the dissection bed in the retrogastric area.  The abdomen was then copiously irrigated and checked for  hemostasis. The effluent was evacuated from the abdomen and then we turned our attention to closure of the trocar sites.   At this point, prior to evacuation of the pneumoperitoneum, we closed all our ports with the laparoscopic suture fascial closure device using 0-Vicryl. The pneumoperitoneum was then evacuated. The wounds were irrigated and found to be hemostatic. The skin was closed using 4-0 Monocryl. Steri strips were applied.  Counts reported as correct.  No immediate complications.

## 2017-03-23 NOTE — ANESTHESIA PREPROCEDURE EVALUATION
Anesthesia Evaluation     . Pt has had prior anesthetic.     No history of anesthetic complications          ROS/MED HX    ENT/Pulmonary:     (+)sleep apnea, tobacco use, Past use uses CPAP , . .    Neurologic:     (+)migraines, seizures last seizure: 2012    (-) CVA   Cardiovascular:  - neg cardiovascular ROS       METS/Exercise Tolerance:     Hematologic:         Musculoskeletal:         GI/Hepatic:     (+) GERD Asymptomatic on medication,      (-) liver disease   Renal/Genitourinary:      (-) renal disease   Endo:     (+) type II DM Using insulin Obesity (BMI 45), .   (-) thyroid disease   Psychiatric:     (+) psychiatric history anxiety and depression      Infectious Disease:         Malignancy:         Other:                     Physical Exam  Normal systems: cardiovascular, pulmonary and dental    Airway   Mallampati: III  TM distance: >3 FB  Neck ROM: full    Dental     Cardiovascular       Pulmonary                     Anesthesia Plan      History & Physical Review  History and physical reviewed and following examination; no interval change.    ASA Status:  3 .    NPO Status:  > 8 hours    Plan for General and ETT with Propofol induction. Maintenance will be Balanced.    PONV prophylaxis:  Ondansetron (or other 5HT-3) and Dexamethasone or Solumedrol  Additional equipment: Videolaryngoscope      Postoperative Care  Postoperative pain management:  IV analgesics.      Consents  Anesthetic plan, risks, benefits and alternatives discussed with:  Patient..                          .

## 2017-03-23 NOTE — ANESTHESIA POSTPROCEDURE EVALUATION
Patient: Saundra Franks    Procedure(s):   lysis of adhesions - Wound Class: I-Clean   - Wound Class: II-Clean Contaminated    Diagnosis:MORBID OBESITY   Diagnosis Additional Information: No value filed.    Anesthesia Type:  General, ETT    Note:  Anesthesia Post Evaluation    Patient location during evaluation: PACU  Patient participation: Able to fully participate in evaluation  Level of consciousness: awake and alert  Pain management: satisfactory to patient  Airway patency: patent  Cardiovascular status: hemodynamically stable  Respiratory status: acceptable and unassisted  Hydration status: balanced  PONV: none     Anesthetic complications: None          Last vitals:  Vitals:    03/23/17 1415 03/23/17 1430 03/23/17 1445   BP: 127/81 (!) 138/91 (!) 143/95   Resp: 14 11 11   Temp:  35.9  C (96.6  F)    SpO2: 99% 99% 98%         Electronically Signed By: Anibal Yanez MD  March 23, 2017  3:07 PM

## 2017-03-23 NOTE — PROGRESS NOTES
Admission medication history interview status for the 3/23/2017  admission is complete. See EPIC admission navigator for prior to admission medications     Medication history source reliability:Good    Medication history interview source(s):Patient    Medication history resources (including written lists, pill bottles, clinic record):None    Primary pharmacy.[facility]; [facility]- [facility]    Additional medication history information not noted on PTA med list :None    Time spent in this activity: 45 minutes    Prior to Admission medications    Medication Sig Last Dose Taking? Auth Provider   DULoxetine HCl (CYMBALTA PO) Take 60 mg by mouth daily 3/23/2017 at 0700 Yes Reported, Patient   SUMATRIPTAN SUCCINATE PO Take 100 mg by mouth every 2 hours as needed for migraine (Max 200mg in 24hours) more than a week at prn Yes Reported, Patient   GABAPENTIN PO Take 400 mg by mouth 3 times daily 3/22/2017 at pm Yes Reported, Patient   LOSARTAN POTASSIUM PO Take 100 mg by mouth every evening 3/22/2017 at pm Yes Reported, Patient   METFORMIN HCL PO Take 1,000 mg by mouth 2 times daily (with meals) 3/22/2017 at pm Yes Reported, Patient   BUSPIRONE HCL PO Take 10 mg by mouth 2 times daily 3/23/2017 at 0700 Yes Reported, Patient   CALCIUM PO Take 1 tablet by mouth 3 times daily 3/22/2017 at pm Yes Reported, Patient   VITAMIN D, CHOLECALCIFEROL, PO Take 1 tablet by mouth daily 3/22/2017 at am Yes Reported, Patient   Acetaminophen (TYLENOL PO) Take 325-650 mg by mouth every 6 hours as needed for mild pain or fever 3/21/2017 at prn Yes Reported, Patient   multivitamin, therapeutic with minerals (MULTI-VITAMIN) TABS Take 2 tablets by mouth daily Herbalife 3/22/2017 at pm Yes Reported, Patient   NOVOLOG MIX 70/30 FLEXPEN (70-30) 100 UNIT/ML susp Inject 0-60 Units Subcutaneous 2 times daily as needed for high blood sugar Checks BG 2-3 times a day  If BG is more than 130, estimates and administers 2 to 60 units.  (Taking differently than prescribed;  "Prescribed as 50 units twice daily) 3/21/2017 at prn Yes Reported, Patient   insulin syringe-needle U-100 30G X 5/16\" 1 ML    Reported, Patient   ONE TOUCH ULTRA test strip    Reported, Patient   B-D U/F 31G X 8 MM insulin pen needle    Reported, Patient   ONE TOUCH LANCETS MISC    Reported, Patient         "

## 2017-03-23 NOTE — IP AVS SNAPSHOT
[facility] [facility]    [address] [place] [address]    Phone:  [phone]                                       After Visit Summary   3/23/2017    Saundra Franks    MRN: [id number]           After Visit Summary Signature Page     I have received my discharge instructions, and my questions have been answered. I have discussed any challenges I see with this plan with the nurse or doctor.    ..........................................................................................................................................  Patient/Patient Representative Signature      ..........................................................................................................................................  Patient Representative Print Name and Relationship to Patient    ..................................................               ................................................  Date                                            Time    ..........................................................................................................................................  Reviewed by Signature/Title    ...................................................              ..............................................  Date                                                            Time

## 2017-03-23 NOTE — ANESTHESIA CARE TRANSFER NOTE
Patient: Saundra Franks    Procedure(s):   lysis of adhesions - Wound Class: I-Clean   - Wound Class: II-Clean Contaminated    Diagnosis: MORBID OBESITY   Diagnosis Additional Information: No value filed.    Anesthesia Type:   General, ETT     Note:  Airway :Face Mask  Patient transferred to:PACU  Comments: Pt to PACU, face mask on, spont respirations, VSS. Report to RN      Vitals: (Last set prior to Anesthesia Care Transfer)    CR[facility] VITALS  3/23/2017 1334 - 3/23/2017 1412      3/23/2017             EKG: Sinus rhythm                Electronically Signed By: LOLIS Jha CRNA  March 23, 2017  2:12 PM

## 2017-03-23 NOTE — IP AVS SNAPSHOT
MRN:[id number]                      After Visit Summary   3/23/2017    Saundra Franks    MRN: [id number]           Thank you!     Thank you for choosing [facility] for your care. Our goal is always to provide you with excellent care. Hearing back from our patients is one way we can continue to improve our services. Please take a few minutes to complete the written survey that you may receive in the mail after you visit with us. Thank you!        Patient Information     Date Of Birth          1985        About your hospital stay     You were admitted on:  March 23, 2017 You last received care in the:  [facility] [facility] [facility]    You were discharged on:  March 26, 2017        Reason for your hospital stay       Bariatric Surgery                  Who to Call     For medical emergencies, please call 911.  For non-urgent questions about your medical care, please call your primary care provider or clinic, [phone]  For questions related to your surgery, please call your surgery clinic        Attending Provider     Provider Specialty    Benson Chavez MD Surgery       Primary Care Provider Office Phone # Fax #    Paulo Martin [phone]       [place] [address]         When to contact your care team       Call [facility] if you have any of the following: fever, dehydration, or increased pain that lasts longer than 2 hours and not improved with rest.                  After Care Instructions     Activity       Your activity upon discharge: activity as tolerated, ambulate in house, no driving while on analgesics and no heavy lifting for 2-3 weeks            Diet       Follow this diet upon discharge: Full liquid.  If full liquids are not tolerated, go back to Clear liquids for 24-48 hours and then try Full liquids again.            Discharge Instructions       Use your incentive spirometer every 2-3  hours at home for there first few days.  Think of this as PT for your lungs.  Until your abdominal pain is gone, your lungs will try to resist being used at their full capacity.  The spirometer helps with this and helps you avoid a pneumonia.    Post Op Diabetes Plan  After bariatric surgery, your diabetes may improve quickly.  For the first week, we want to safeguard against blood sugars dropping TOO LOW.  Please check your blood sugars 4 times daily and record the numbers.  Our goal would be to keep blood sugars between 100-180.  To start, we will try using your pre surgery medications but take about half dose of each medication.  Continue the same timing and frequency, but take half of your normal dose if the medication allows for this.  If blood sugars are running consistently >180, add more of your insulin or diabetic medication back in.  If blood sugars are running low (<100), take one of your medications out completely.  If questions, please call your Endocrinologist or Primary Care Provider, whomever typically manages your diabetes.            Wound care and dressings       Instructions to care for your wound at home: may get incision wet in shower but do not soak or scrub.                  Follow-up Appointments     Follow-up and recommended labs and tests       Follow up with [facility] within 1 week  to evaluate after surgery. No follow up labs or test are needed.                  Your next 10 appointments already scheduled     Mar 28, 2017  2:00 PM CDT   Post Op with Jeyson Padgett Rn, RN   [facility] ([facility])    [address] [place] [address]   [phone]            Mar 28, 2017  2:20 PM CDT   Post Op with Sukhjinder Barton PA-C   [facility] ([facility])    [address] [place] [address]   [phone]            Apr 06,  2017 11:00 AM CDT   Post Op with Sh Dayron Rn, RN   [facility] [place] ([facility])    [address]  [address]  [place] [address]   [phone]            Apr 06, 2017 11:30 AM CDT   Post Op with Sh Dayron Diet 3, RD   [facility] [place] ([facility])    [address]  [address]  [place] [address]   [phone]            Apr 19, 2017  9:00 AM CDT   Post Op with Sh Wl Diet 1, RD   [facility] [place] ([facility])    [address]  [address]  [place] [address]   [phone]              Further instructions from your care team            For informational purposes only. Not to replace the advice of your health care provider. Copyright   2006 [facility]. All rights reserved. [employer] 489874 - REV 09/14  After Bariatric Surgery  [facility]  Note: Before you go home, ask your nurse to order your pain medicine from the pharmacy. Be sure you have your medicine with you when you leave.  How much fluid should I drink?    Drink at least 1 ounce of fluid every 15 minutes (1/2 cup per hour) during the day.     Carry a water bottle with you. Drink from it often.    Keep track of how much fluid you drink in a day.      Remember:  - Do not use straws, chew gum or suck on hard candies. They may cause painful gas.   - No cold drinks.  - No coffee, soda pop or drinks with caffeine. These may cause stomach pain.  - No alcohol. It is bad for your liver and will cause stomach pain. It also adds a lot of calories.  What can I do for pain control?  ? Take the prescribed liquid pain medicine for up to 2 weeks. Do not drive while you are taking this medicine. This is dangerous.  ? You may also take liquid Tylenol (acetaminophen) for pain in place of the prescribed pain medicine. Do not take Advil (ibuprofen). It  will increase your risk for bleeding.  ? If your doctor prescribes Zantac, take it as ordered. Take it for 3 months to prevent ulcers.  ? If you took an antacid before you had surgery, keep taking it for 3 months after surgery. This will help prevent ulcers.  ? Take any other medicines that your doctor tells you to take.  ? Wear your binder to support your belly muscles.          How much rest do I need?  Get plenty of rest the first few days after surgery. Balance rest and activity.  Do not nap more than one hour during the day. Set a timer to wake yourself up, if needed. Too much sleep will keep you from drinking enough fluid during the day.  What should I know about my incisions (cuts)?    Call your doctor if you have any of these signs of infection:   - Redness around the site.   - Drainage that smells bad.   - Fever of 101  F (38.3  C) or higher when taken under the tongue.- Chills.  If you have a drain:\  Will my urine or bowel movements change?   You might not have a bowel movement for several days after surgery. Your first bowel movements will likely be liquid.   Gastric bypass or sleeve gastrectomy: You may also notice old blood or a darker color in your stools (bowel movements).   Your urine should be clear to light yellow. This shows that you are drinking enough fluid. You should urinate (pass water) at least 2 to 3 times during the day. If not, call us.  What kind of activity is safe?  For 4 weeks after surgery:     Walk for a short time every day.    Do not jog or run.    No weight lifting or belly exercises.  No swimming, baths or hot tubs until your cuts are healed (scabs are gone). You may shower.  No outside activity in hot, humid weather until you can drink 48 to 64 ounces of fluid in 24 hours. If you sweat a lot, your body may lose too much water.   The first month after surgery, do not take any trips where you must sit for a long time. You could get a blood clot in your legs.  When to call the  "clinic  Call the clinic at [phone] if:    Your pain medicine is not working.    You do not urinate (pass water) 2 to 3 times per day.    You have any signs of infection.    You have belly pain that gets worse and worse.    You have swollen legs with pain behind the knee or calf.    You have chest pain or feel very short of breath.    You have any questions or concerns.    You have a sudden severe increase in heart rate.    You have vomiting that gets worse and worse.  When should I go back to the clinic?  Time Gastric bypass o    Week 1 See the physician or physician assistant (PA).    Week 2 See the nurse and dietitian.    Week 4 Have a nutrition consult with the dietitian.    Week 6     For the first year (or until your BMI is less than 30) .           Pending Results     No orders found from 3/21/2017 to 3/24/2017.            Statement of Approval     Ordered          03/26/17 0945  I have reviewed and agree with all the recommendations and orders detailed in this document.  EFFECTIVE NOW     Approved and electronically signed by:  Britany Bernal MD             Admission Information     Date & Time Provider Department Dept. Phone    3/23/2017 Benson Chavez MD [place] [address] [facility] [phone]      Your Vitals Were     Blood Pressure Pulse Temperature Respirations Height Weight    114/83 (BP Location: Right arm) 77 98.4  F (36.9  C) (Oral) 16 1.626 m (5' 4\") 117.9 kg (260 lb)    Last Period Pulse Oximetry BMI (Body Mass Index)             (LMP Unknown) 95% 44.63 kg/m2         MyChart Information     [email or web address] gives you secure access to your electronic health record. If you see a primary care provider, you can also send messages to your care team and make appointments. If you have questions, please call your primary care clinic.  If you do not have a primary care provider, please call [phone] and they will assist you.        Care EveryWhere ID     This is your Care " "EveryWhere ID. This could be used by other organizations to access your [facility] medical records  [id number]           Review of your medicines      START taking        Dose / Directions    ondansetron 4 MG ODT tab   Commonly known as:  ZOFRAN-ODT   Used for:  Morbid obesity due to excess calories (H), Bariatric surgery status, PONV (postoperative nausea and vomiting)        Dose:  4 mg   Take 1 tablet (4 mg) by mouth every 6 hours as needed for nausea   Quantity:  20 tablet   Refills:  0       oxyCODONE 5 MG/5ML solution   Commonly known as:  ROXICODONE   Used for:  Bariatric surgery status, Acute post-operative pain        Dose:  5-10 mg   Take 5-10 mLs (5-10 mg) by mouth every 3 hours as needed for moderate to severe pain   Quantity:  300 mL   Refills:  0       ranitidine 75 MG tablet   Commonly known as:  ZANTAC   Used for:  Bariatric surgery status        Dose:  75 mg   Take 1 tablet (75 mg) by mouth 2 times daily   Quantity:  60 tablet   Refills:  2         CONTINUE these medicines which have NOT CHANGED        Dose / Directions    B-D U/F 31G X 8 MM   Generic drug:  insulin pen needle        Refills:  3       BUSPIRONE HCL PO        Dose:  10 mg   Take 10 mg by mouth 2 times daily   Refills:  0       CALCIUM PO        Dose:  1 tablet   Take 1 tablet by mouth 3 times daily   Refills:  0       CYMBALTA PO        Dose:  60 mg   Take 60 mg by mouth daily   Refills:  0       GABAPENTIN PO        Dose:  400 mg   Take 400 mg by mouth 3 times daily   Refills:  0       insulin syringe-needle U-100 30G X 5/16\" 1 ML        Refills:  0       LOSARTAN POTASSIUM PO        Dose:  100 mg   Take 100 mg by mouth every evening   Refills:  0       METFORMIN HCL PO        Dose:  1000 mg   Take 1,000 mg by mouth 2 times daily (with meals)   Refills:  0       Multi-vitamin Tabs tablet   Used for:  Obesity, Class III, BMI 40-49.9 (morbid obesity) (H)        Dose:  2 tablet   Take 2 tablets by mouth daily [employer]   Refills:  0    "    NovoLOG MIX 70/30 FLEXPEN injection   Generic drug:  insulin aspart prot & aspart        Dose:  0-60 Units   Inject 0-60 Units Subcutaneous 2 times daily as needed for high blood sugar Checks BG 2-3 times a day If BG is more than 130, estimates and administers 2 to 60 units. (Taking differently than prescribed; Prescribed as 50 units twice daily)   Refills:  0       ONE TOUCH LANCETS Misc        Refills:  2       ONE TOUCH ULTRA test strip   Generic drug:  blood glucose monitoring        Refills:  3       SUMATRIPTAN SUCCINATE PO        Dose:  100 mg   Take 100 mg by mouth every 2 hours as needed for migraine (Max 200mg in 24hours)   Refills:  0       TYLENOL PO        Dose:  325-650 mg   Take 325-650 mg by mouth every 6 hours as needed for mild pain or fever   Refills:  0       VITAMIN D (CHOLECALCIFEROL) PO        Dose:  1 tablet   Take 1 tablet by mouth daily   Refills:  0            Where to get your medicines      These medications were sent to [facility] [address] [place] [address] & [address], [place] [address]     Phone:  [phone]     ondansetron 4 MG ODT tab    ranitidine 75 MG tablet         Some of these will need a paper prescription and others can be bought over the counter. Ask your nurse if you have questions.     Bring a paper prescription for each of these medications     oxyCODONE 5 MG/5ML solution                Protect others around you: Learn how to safely use, store and throw away your medicines at www.disposemymeds.org.             Medication List: This is a list of all your medications and when to take them. Check marks below indicate your daily home schedule. Keep this list as a reference.      Medications           Morning Afternoon Evening Bedtime As Needed    B-D U/F 31G X 8 MM   Generic drug:  insulin pen needle                                BUSPIRONE HCL PO   Take 10 mg by mouth 2 times daily   Last time this was given:  " 10 mg on 3/26/2017 10:32 AM                                CALCIUM PO   Take 1 tablet by mouth 3 times daily                                CYMBALTA PO   Take 60 mg by mouth daily   Last time this was given:  60 mg on 3/26/2017  9:23 AM                                GABAPENTIN PO   Take 400 mg by mouth 3 times daily   Last time this was given:  400 mg on 3/26/2017 10:33 AM                                insulin syringe-needle U-100 30G X 5/16\" 1 ML                                LOSARTAN POTASSIUM PO   Take 100 mg by mouth every evening   Last time this was given:  100 mg on 3/25/2017  9:09 PM                                METFORMIN HCL PO   Take 1,000 mg by mouth 2 times daily (with meals)                                Multi-vitamin Tabs tablet   Take 2 tablets by mouth daily Herbalife                                NovoLOG MIX 70/30 FLEXPEN injection   Inject 0-60 Units Subcutaneous 2 times daily as needed for high blood sugar Checks BG 2-3 times a day If BG is more than 130, estimates and administers 2 to 60 units. (Taking differently than prescribed; Prescribed as 50 units twice daily)   Generic drug:  insulin aspart prot & aspart                                ondansetron 4 MG ODT tab   Commonly known as:  ZOFRAN-ODT   Take 1 tablet (4 mg) by mouth every 6 hours as needed for nausea                                ONE TOUCH LANCETS Misc                                ONE TOUCH ULTRA test strip   Generic drug:  blood glucose monitoring                                oxyCODONE 5 MG/5ML solution   Commonly known as:  ROXICODONE   Take 5-10 mLs (5-10 mg) by mouth every 3 hours as needed for moderate to severe pain   Last time this was given:  10 mg on 3/26/2017 10:35 AM                                ranitidine 75 MG tablet   Commonly known as:  ZANTAC   Take 1 tablet (75 mg) by mouth 2 times daily   Last time this was given:  75 mg on 3/26/2017  9:19 AM                                SUMATRIPTAN SUCCINATE PO "   Take 100 mg by mouth every 2 hours as needed for migraine (Max 200mg in 24hours)   Last time this was given:  100 mg on 3/25/2017  1:46 AM                                TYLENOL PO   Take 325-650 mg by mouth every 6 hours as needed for mild pain or fever   Last time this was given:  975 mg on 3/26/2017  6:37 AM                                VITAMIN D (CHOLECALCIFEROL) PO   Take 1 tablet by mouth daily

## 2017-03-23 NOTE — OR NURSING
PNDS Criteria met.  Dr Prakash here to assess Ms. Franks; order received to transfer to [facility] for continued care.  Hand-off report called to Brooke PÉREZ.  To [phone] per cart with all belongings.

## 2017-03-24 ENCOUNTER — APPOINTMENT (OUTPATIENT)
Dept: GENERAL RADIOLOGY | Facility: CLINIC | Age: 32
DRG: 621 | End: 2017-03-24
Attending: PHYSICIAN ASSISTANT
Payer: COMMERCIAL

## 2017-03-24 LAB
ANION GAP SERPL CALCULATED.3IONS-SCNC: 8 MMOL/L (ref 3–14)
CHLORIDE SERPL-SCNC: 107 MMOL/L (ref 94–109)
CO2 SERPL-SCNC: 25 MMOL/L (ref 20–32)
GLUCOSE BLDC GLUCOMTR-MCNC: 137 MG/DL (ref 70–99)
GLUCOSE BLDC GLUCOMTR-MCNC: 151 MG/DL (ref 70–99)
GLUCOSE BLDC GLUCOMTR-MCNC: 151 MG/DL (ref 70–99)
GLUCOSE BLDC GLUCOMTR-MCNC: 166 MG/DL (ref 70–99)
GLUCOSE BLDC GLUCOMTR-MCNC: 167 MG/DL (ref 70–99)
GLUCOSE BLDC GLUCOMTR-MCNC: 175 MG/DL (ref 70–99)
HBA1C MFR BLD: 7.3 % (ref 4.3–6)
HGB BLD-MCNC: 11.8 G/DL (ref 11.7–15.7)
POTASSIUM SERPL-SCNC: 3.7 MMOL/L (ref 3.4–5.3)
SODIUM SERPL-SCNC: 140 MMOL/L (ref 133–144)

## 2017-03-24 PROCEDURE — 25000128 H RX IP 250 OP 636: Performed by: SURGERY

## 2017-03-24 PROCEDURE — 25000125 ZZHC RX 250: Performed by: PHYSICIAN ASSISTANT

## 2017-03-24 PROCEDURE — 25800025 ZZH RX 258: Performed by: PHYSICIAN ASSISTANT

## 2017-03-24 PROCEDURE — 80051 ELECTROLYTE PANEL: CPT | Performed by: PHYSICIAN ASSISTANT

## 2017-03-24 PROCEDURE — 25000132 ZZH RX MED GY IP 250 OP 250 PS 637: Performed by: PHYSICIAN ASSISTANT

## 2017-03-24 PROCEDURE — 40000275 ZZH STATISTIC RCP TIME EA 10 MIN

## 2017-03-24 PROCEDURE — 25000132 ZZH RX MED GY IP 250 OP 250 PS 637: Performed by: SURGERY

## 2017-03-24 PROCEDURE — 36415 COLL VENOUS BLD VENIPUNCTURE: CPT | Performed by: PHYSICIAN ASSISTANT

## 2017-03-24 PROCEDURE — 00000146 ZZHCL STATISTIC GLUCOSE BY METER IP

## 2017-03-24 PROCEDURE — 94660 CPAP INITIATION&MGMT: CPT

## 2017-03-24 PROCEDURE — 12000007 ZZH R&B INTERMEDIATE

## 2017-03-24 PROCEDURE — 25000128 H RX IP 250 OP 636: Performed by: PHYSICIAN ASSISTANT

## 2017-03-24 PROCEDURE — 83036 HEMOGLOBIN GLYCOSYLATED A1C: CPT | Performed by: PHYSICIAN ASSISTANT

## 2017-03-24 PROCEDURE — 85018 HEMOGLOBIN: CPT | Performed by: PHYSICIAN ASSISTANT

## 2017-03-24 PROCEDURE — 40000940 XR UPPER GI WATER SOLUBLE

## 2017-03-24 RX ORDER — DIPHENHYDRAMINE HCL 25 MG
25 CAPSULE ORAL EVERY 6 HOURS PRN
Status: DISCONTINUED | OUTPATIENT
Start: 2017-03-24 | End: 2017-03-26 | Stop reason: HOSPADM

## 2017-03-24 RX ORDER — NALOXONE HYDROCHLORIDE 0.4 MG/ML
.1-.4 INJECTION, SOLUTION INTRAMUSCULAR; INTRAVENOUS; SUBCUTANEOUS
Status: DISCONTINUED | OUTPATIENT
Start: 2017-03-24 | End: 2017-03-26 | Stop reason: HOSPADM

## 2017-03-24 RX ORDER — SIMETHICONE 40MG/0.6ML
133 SUSPENSION, DROPS(FINAL DOSAGE FORM)(ML) ORAL 4 TIMES DAILY
Status: DISCONTINUED | OUTPATIENT
Start: 2017-03-24 | End: 2017-03-26 | Stop reason: HOSPADM

## 2017-03-24 RX ORDER — OXYCODONE HCL 5 MG/5 ML
5-10 SOLUTION, ORAL ORAL
Qty: 300 ML | Refills: 0 | Status: SHIPPED | OUTPATIENT
Start: 2017-03-24 | End: 2017-03-28

## 2017-03-24 RX ORDER — ONDANSETRON 4 MG/1
4 TABLET, ORALLY DISINTEGRATING ORAL EVERY 6 HOURS PRN
Qty: 20 TABLET | Refills: 0 | Status: SHIPPED | OUTPATIENT
Start: 2017-03-24

## 2017-03-24 RX ORDER — DIPHENHYDRAMINE HYDROCHLORIDE 50 MG/ML
25 INJECTION INTRAMUSCULAR; INTRAVENOUS EVERY 6 HOURS PRN
Status: DISCONTINUED | OUTPATIENT
Start: 2017-03-24 | End: 2017-03-26 | Stop reason: HOSPADM

## 2017-03-24 RX ADMIN — ENOXAPARIN SODIUM 40 MG: 40 INJECTION SUBCUTANEOUS at 20:28

## 2017-03-24 RX ADMIN — DULOXETINE HYDROCHLORIDE 60 MG: 20 CAPSULE, DELAYED RELEASE ORAL at 09:47

## 2017-03-24 RX ADMIN — SODIUM CHLORIDE, POTASSIUM CHLORIDE, SODIUM LACTATE AND CALCIUM CHLORIDE: 600; 310; 30; 20 INJECTION, SOLUTION INTRAVENOUS at 07:37

## 2017-03-24 RX ADMIN — HETASTARCH 500 ML: 6 INJECTION, SOLUTION INTRAVENOUS at 08:00

## 2017-03-24 RX ADMIN — DIPHENHYDRAMINE HYDROCHLORIDE 25 MG: 50 INJECTION, SOLUTION INTRAMUSCULAR; INTRAVENOUS at 03:21

## 2017-03-24 RX ADMIN — HETASTARCH 500 ML: 6 INJECTION, SOLUTION INTRAVENOUS at 16:01

## 2017-03-24 RX ADMIN — LOSARTAN POTASSIUM 100 MG: 100 TABLET, FILM COATED ORAL at 20:28

## 2017-03-24 RX ADMIN — DIATRIZOATE MEGLUMINE AND DIATRIZOATE SODIUM 30 ML: 660; 100 SOLUTION ORAL; RECTAL at 08:46

## 2017-03-24 RX ADMIN — INSULIN ASPART 1 UNITS: 100 INJECTION, SOLUTION INTRAVENOUS; SUBCUTANEOUS at 05:33

## 2017-03-24 RX ADMIN — DIPHENHYDRAMINE HYDROCHLORIDE 25 MG: 50 INJECTION, SOLUTION INTRAMUSCULAR; INTRAVENOUS at 07:47

## 2017-03-24 RX ADMIN — CEFAZOLIN SODIUM 2 G: 2 INJECTION, SOLUTION INTRAVENOUS at 05:26

## 2017-03-24 RX ADMIN — OXYCODONE HYDROCHLORIDE 10 MG: 5 SOLUTION ORAL at 18:46

## 2017-03-24 RX ADMIN — BUSPIRONE HYDROCHLORIDE 10 MG: 10 TABLET ORAL at 09:47

## 2017-03-24 RX ADMIN — SODIUM CHLORIDE, POTASSIUM CHLORIDE, SODIUM LACTATE AND CALCIUM CHLORIDE: 600; 310; 30; 20 INJECTION, SOLUTION INTRAVENOUS at 00:54

## 2017-03-24 RX ADMIN — SIMETHICONE 133 MG: 20 EMULSION ORAL at 11:33

## 2017-03-24 RX ADMIN — SODIUM CHLORIDE, POTASSIUM CHLORIDE, SODIUM LACTATE AND CALCIUM CHLORIDE: 600; 310; 30; 20 INJECTION, SOLUTION INTRAVENOUS at 22:43

## 2017-03-24 RX ADMIN — GABAPENTIN 400 MG: 100 CAPSULE ORAL at 09:46

## 2017-03-24 RX ADMIN — SIMETHICONE 133 MG: 20 EMULSION ORAL at 20:28

## 2017-03-24 RX ADMIN — KETOROLAC TROMETHAMINE 30 MG: 30 INJECTION, SOLUTION INTRAMUSCULAR at 03:21

## 2017-03-24 RX ADMIN — SIMETHICONE 133 MG: 20 EMULSION ORAL at 18:46

## 2017-03-24 RX ADMIN — SUMATRIPTAN 100 MG: 50 TABLET, FILM COATED ORAL at 19:21

## 2017-03-24 RX ADMIN — BUSPIRONE HYDROCHLORIDE 10 MG: 10 TABLET ORAL at 20:28

## 2017-03-24 RX ADMIN — INSULIN ASPART 1 UNITS: 100 INJECTION, SOLUTION INTRAVENOUS; SUBCUTANEOUS at 20:29

## 2017-03-24 RX ADMIN — SIMETHICONE 133 MG: 20 EMULSION ORAL at 14:02

## 2017-03-24 RX ADMIN — INSULIN ASPART 1 UNITS: 100 INJECTION, SOLUTION INTRAVENOUS; SUBCUTANEOUS at 01:32

## 2017-03-24 RX ADMIN — HYDROXYZINE HYDROCHLORIDE 25 MG: 10 SYRUP ORAL at 16:57

## 2017-03-24 RX ADMIN — GABAPENTIN 400 MG: 100 CAPSULE ORAL at 16:12

## 2017-03-24 RX ADMIN — ACETAMINOPHEN 975 MG: 325 TABLET, FILM COATED ORAL at 20:28

## 2017-03-24 RX ADMIN — OXYCODONE HYDROCHLORIDE 10 MG: 5 SOLUTION ORAL at 22:44

## 2017-03-24 RX ADMIN — GABAPENTIN 400 MG: 100 CAPSULE ORAL at 20:28

## 2017-03-24 RX ADMIN — ACETAMINOPHEN 975 MG: 325 TABLET, FILM COATED ORAL at 14:02

## 2017-03-24 RX ADMIN — MORPHINE SULFATE 150 MG: 5 INJECTION, SOLUTION INTRAVENOUS at 01:25

## 2017-03-24 RX ADMIN — SODIUM CHLORIDE, POTASSIUM CHLORIDE, SODIUM LACTATE AND CALCIUM CHLORIDE: 600; 310; 30; 20 INJECTION, SOLUTION INTRAVENOUS at 15:05

## 2017-03-24 RX ADMIN — OXYCODONE HYDROCHLORIDE 10 MG: 5 SOLUTION ORAL at 11:42

## 2017-03-24 RX ADMIN — OXYCODONE HYDROCHLORIDE 10 MG: 5 SOLUTION ORAL at 15:05

## 2017-03-24 RX ADMIN — ENOXAPARIN SODIUM 40 MG: 40 INJECTION SUBCUTANEOUS at 09:49

## 2017-03-24 RX ADMIN — INSULIN ASPART 1 UNITS: 100 INJECTION, SOLUTION INTRAVENOUS; SUBCUTANEOUS at 09:53

## 2017-03-24 RX ADMIN — INSULIN ASPART 1 UNITS: 100 INJECTION, SOLUTION INTRAVENOUS; SUBCUTANEOUS at 11:55

## 2017-03-24 NOTE — CONSULTS
"NUTRITION EDUCATION    REASON FOR ASSESSMENT:  Bariatric Surgery Consult    CURRENT DIET:  Bariatric Clear Liquids    NUTRITION HISTORY:  Patient worked with clinical dietitian in [facility] prior to surgery to assist with lifestyle modification.    ANTHROPOMETRICS:   Ht: 5'4\"  Wt: 117.9 kg  BMI: 44.63 kg/m2  IBW: 54.5 kg  %IBW: 216%    ASSESSED NUTRITION NEEDS:  Energy Needs: 8783-5623 kcals (11 - 14 kcal/kg ABW)                      Protein Needs: 55-82 (1 - 1.5 gm/kg IBW)  Fluid Needs: 8746-1739 mL (1mL/kcal/ABW)    Know patient will not meet assessed needs due to the restrictive nature of surgery.    NUTRITION DIAGNOSIS:   Food- and nutrition related knowledge deficit related to bariatric surgery as evidence by Laparoscopic Gastric Bypass surgery on 3/23/2017.    INTERVENTIONS:    Nutrition Prescription:    Recommended patient follow bariatric diet advancement for Laparoscopic Gastric Bypass    Implementation:    Nutrition Education (Content):  a) Reviewed Laparoscopic Gastric Bypass diet guidelines  b) Provided handouts:  Nutrition After Laparoscopic Gastric Bypass and Vitamin and Mineral Supplements After Weight Loss Surgery    Nutrition Education (Application):  c) Discussed current eating habits and recommended alternative food choices  d) Patient verbalizes understanding of diet by listing appropriate foods for home    Anticipate good compliance    Diet Education - refer to Education Flowsheet    Goals:    Patient will follow bariatric diet advancement schedule    Patient will follow post-operative vitamin mineral schedule      Follow Up:    Patient to follow up in 2 weeks with RD in [facility]    Provided RD contact information for future questions      Letha Diaz RD, LD  Clinical Dietitian      "

## 2017-03-24 NOTE — PLAN OF CARE
Problem: Goal Outcome Summary  Goal: Goal Outcome Summary  Outcome: No Change  Pt. A&O x4. VS stable.  Up with SBA, transfers and walking. UGI completed, advanced to clear liquid diet, tolerated well on shift. PCA d/c'd, oral medication effective for pain management. PRN dose of Hespan administered r/t low urine output, per Ramiro ok to keep wilson in overnight. Lap sites CDI with steri strips and bandaids.

## 2017-03-24 NOTE — PROVIDER NOTIFICATION
MD. Meza notified about patient itching which is not relieved by Benadryl. Order received for Vistaril

## 2017-03-24 NOTE — PROGRESS NOTES
Patient was on home CPAP unit throughout the night.  Will cont to monitor.  3/24/2017  Karen Tavera RRT

## 2017-03-24 NOTE — PROVIDER NOTIFICATION
MD. Meza notified about patient itching which is not relieved by Vistaril and patient requested a different medication. Order received to D/c dilaudid and start Morphine.

## 2017-03-24 NOTE — PROVIDER NOTIFICATION
MD. Meza notified about patient itching which is not relieved by Benadryl and Vistaril and patient requested a different medication Morphine not working. Order received to D/C PCA and administer Benadryl 25 MG IV and IV Dilaudid.  Plan discussed with patient and she refused to have PCA D/C, she stated that the Dilaudid cause more itching and she will keep the PCA.

## 2017-03-24 NOTE — PLAN OF CARE
Problem: Goal Outcome Summary  Goal: Goal Outcome Summary  Outcome: No Change  Patient is alert and orientated x 4, up with stand by assist, vital signs stable, incision CDI, abdominal binder inplace, pain managed with PCA Morphine, complaint of itching Benadryl administered. Shah has adequate output. and 166 insulin administered.

## 2017-03-24 NOTE — PROGRESS NOTES
Low UO since surgery.    D/C Toradol  Keep wilson for closer monitoring until this picks up.  Hespan PRN parameters  Encourage mobility

## 2017-03-24 NOTE — PROGRESS NOTES
"Bariatric Surgery Progress Note         Assessment:      Saundra Franks is a 31 year old female S/P Lap Gastric Bypass, POD #1   Morbid Obesity, BMI >40            Plan:   UGI this am is WNL, start protocol:    D/C wilson this am.  Start water now.  Clears at lunch.  Fulls tomorrow am.  D/C PCA when tolerating PO and start liquid pain medication.  Antiemetics PRN N/V.  Benadryl and or Atarax for itching.  Best thing though will be to eliminate the source which is the PCA most likely.  Lovenox and PCDs.  Ambulate at least QID  Discussed OR findings    Dispo:Likely home tomorrow.        Interval History:   Doing ok.  Itching all night.  Tried changing to Morphine PCA but no better than Dilaudid PCA.  Tried benadryl and vistaril with minimal relief.         Physical Exam:   /63 (BP Location: Right arm)  Pulse 96  Temp 99.9  F (37.7  C) (Axillary)  Resp 16  Ht 1.626 m (5' 4\")  Wt 117.9 kg (260 lb)  LMP  (LMP Unknown)  SpO2 93%  BMI 44.63 kg/m2  I/O last 3 completed shifts:  In: 5008 [P.O.:100; I.V.:4908]  Out: 725 [Urine:650; Blood:75]  General: NAD, pleasant, alert and oriented x3  Abdomen: soft, with binder on  Incision: no complaints   UGI: WNL    Labs (most recent at bottom):     Results for orders placed or performed during the hospital encounter of 03/23/17 (from the past 24 hour(s))   Glucose by meter   Result Value Ref Range    Glucose 170 (H) 70 - 99 mg/dL   Platelet count   Result Value Ref Range    Platelet Count 279 150 - 450 10e9/L   Creatinine   Result Value Ref Range    Creatinine 0.77 0.52 - 1.04 mg/dL    GFR Estimate 87 >60 mL/min/1.7m2    GFR Estimate If Black >90   GFR Calc   >60 mL/min/1.7m2   Glucose by meter   Result Value Ref Range    Glucose 175 (H) 70 - 99 mg/dL   Glucose by meter   Result Value Ref Range    Glucose 166 (H) 70 - 99 mg/dL   Glucose by meter   Result Value Ref Range    Glucose 151 (H) 70 - 99 mg/dL   Glucose by meter   Result Value Ref Range    " Glucose 166 (H) 70 - 99 mg/dL   Hemoglobin   Result Value Ref Range    Hemoglobin 11.8 11.7 - 15.7 g/dL   Electrolyte panel   Result Value Ref Range    Sodium 140 133 - 144 mmol/L    Potassium 3.7 3.4 - 5.3 mmol/L    Chloride 107 94 - 109 mmol/L    Carbon Dioxide 25 20 - 32 mmol/L    Anion Gap 8 3 - 14 mmol/L   Hemoglobin A1c   Result Value Ref Range    Hemoglobin A1C 7.3 (H) 4.3 - 6.0 %   XR Upper GI Water Soluble: Anamaria-en-Y    Narrative    XR UPPER GI WATER SOLUBLE  3/24/2017 8:43 AM     HISTORY:  Postop.    FLUOROSCOPY TIME: .3 minutes. 4 saved images.    FINDINGS: Distal esophagus and gastric remnant appear normal.  Gastrojejunostomy functions freely. No evidence for obstruction or  leak.      Impression    IMPRESSION: Normal postoperative upper GI.    LA MAYS MD   Glucose by meter   Result Value Ref Range    Glucose 167 (H) 70 - 99 mg/dL         Kole Butcher  Pager: [phone]  [facility]: [phone]

## 2017-03-24 NOTE — PLAN OF CARE
Problem: Goal Outcome Summary  Goal: Goal Outcome Summary  Outcome: No Change  Patient using pca, increased dose with some relief.  Ambulated in halls x 1. Norris ice chips without nausea.

## 2017-03-25 LAB
GLUCOSE BLDC GLUCOMTR-MCNC: 129 MG/DL (ref 70–99)
GLUCOSE BLDC GLUCOMTR-MCNC: 141 MG/DL (ref 70–99)
GLUCOSE BLDC GLUCOMTR-MCNC: 142 MG/DL (ref 70–99)
GLUCOSE BLDC GLUCOMTR-MCNC: 153 MG/DL (ref 70–99)
GLUCOSE BLDC GLUCOMTR-MCNC: 154 MG/DL (ref 70–99)
GLUCOSE BLDC GLUCOMTR-MCNC: 175 MG/DL (ref 70–99)

## 2017-03-25 PROCEDURE — 25000128 H RX IP 250 OP 636: Performed by: FAMILY MEDICINE

## 2017-03-25 PROCEDURE — 25000132 ZZH RX MED GY IP 250 OP 250 PS 637: Performed by: SURGERY

## 2017-03-25 PROCEDURE — 25800025 ZZH RX 258: Performed by: PHYSICIAN ASSISTANT

## 2017-03-25 PROCEDURE — 12000007 ZZH R&B INTERMEDIATE

## 2017-03-25 PROCEDURE — 00000146 ZZHCL STATISTIC GLUCOSE BY METER IP

## 2017-03-25 PROCEDURE — 25000132 ZZH RX MED GY IP 250 OP 250 PS 637: Performed by: PHYSICIAN ASSISTANT

## 2017-03-25 PROCEDURE — 25000128 H RX IP 250 OP 636: Performed by: PHYSICIAN ASSISTANT

## 2017-03-25 RX ORDER — FUROSEMIDE 10 MG/ML
20 INJECTION INTRAMUSCULAR; INTRAVENOUS ONCE
Status: COMPLETED | OUTPATIENT
Start: 2017-03-25 | End: 2017-03-25

## 2017-03-25 RX ADMIN — INSULIN ASPART 1 UNITS: 100 INJECTION, SOLUTION INTRAVENOUS; SUBCUTANEOUS at 21:13

## 2017-03-25 RX ADMIN — ACETAMINOPHEN 975 MG: 325 TABLET, FILM COATED ORAL at 21:09

## 2017-03-25 RX ADMIN — SIMETHICONE 133 MG: 20 EMULSION ORAL at 12:53

## 2017-03-25 RX ADMIN — RANITIDINE 75 MG: 75 TABLET, FILM COATED ORAL at 08:12

## 2017-03-25 RX ADMIN — SODIUM CHLORIDE, POTASSIUM CHLORIDE, SODIUM LACTATE AND CALCIUM CHLORIDE: 600; 310; 30; 20 INJECTION, SOLUTION INTRAVENOUS at 04:40

## 2017-03-25 RX ADMIN — OXYCODONE HYDROCHLORIDE 10 MG: 5 SOLUTION ORAL at 11:23

## 2017-03-25 RX ADMIN — DULOXETINE HYDROCHLORIDE 60 MG: 20 CAPSULE, DELAYED RELEASE ORAL at 08:12

## 2017-03-25 RX ADMIN — INSULIN ASPART 1 UNITS: 100 INJECTION, SOLUTION INTRAVENOUS; SUBCUTANEOUS at 08:35

## 2017-03-25 RX ADMIN — FUROSEMIDE 20 MG: 10 INJECTION, SOLUTION INTRAVENOUS at 10:34

## 2017-03-25 RX ADMIN — BUSPIRONE HYDROCHLORIDE 10 MG: 10 TABLET ORAL at 08:12

## 2017-03-25 RX ADMIN — BUSPIRONE HYDROCHLORIDE 10 MG: 10 TABLET ORAL at 21:09

## 2017-03-25 RX ADMIN — SIMETHICONE 133 MG: 20 EMULSION ORAL at 08:40

## 2017-03-25 RX ADMIN — OXYCODONE HYDROCHLORIDE 10 MG: 5 SOLUTION ORAL at 17:34

## 2017-03-25 RX ADMIN — GABAPENTIN 400 MG: 100 CAPSULE ORAL at 08:12

## 2017-03-25 RX ADMIN — GABAPENTIN 400 MG: 100 CAPSULE ORAL at 21:09

## 2017-03-25 RX ADMIN — SIMETHICONE 133 MG: 20 EMULSION ORAL at 17:34

## 2017-03-25 RX ADMIN — INSULIN ASPART 1 UNITS: 100 INJECTION, SOLUTION INTRAVENOUS; SUBCUTANEOUS at 12:53

## 2017-03-25 RX ADMIN — SUMATRIPTAN 100 MG: 50 TABLET, FILM COATED ORAL at 01:46

## 2017-03-25 RX ADMIN — OXYCODONE HYDROCHLORIDE 10 MG: 5 SOLUTION ORAL at 21:18

## 2017-03-25 RX ADMIN — ENOXAPARIN SODIUM 40 MG: 40 INJECTION SUBCUTANEOUS at 08:11

## 2017-03-25 RX ADMIN — ENOXAPARIN SODIUM 40 MG: 40 INJECTION SUBCUTANEOUS at 21:10

## 2017-03-25 RX ADMIN — INSULIN ASPART 1 UNITS: 100 INJECTION, SOLUTION INTRAVENOUS; SUBCUTANEOUS at 01:31

## 2017-03-25 RX ADMIN — ACETAMINOPHEN 975 MG: 325 TABLET, FILM COATED ORAL at 06:38

## 2017-03-25 RX ADMIN — ACETAMINOPHEN 975 MG: 325 TABLET, FILM COATED ORAL at 14:03

## 2017-03-25 RX ADMIN — INSULIN ASPART 1 UNITS: 100 INJECTION, SOLUTION INTRAVENOUS; SUBCUTANEOUS at 04:47

## 2017-03-25 RX ADMIN — OXYCODONE HYDROCHLORIDE 10 MG: 5 SOLUTION ORAL at 01:32

## 2017-03-25 RX ADMIN — OXYCODONE HYDROCHLORIDE 10 MG: 5 SOLUTION ORAL at 08:10

## 2017-03-25 RX ADMIN — OXYCODONE HYDROCHLORIDE 10 MG: 5 SOLUTION ORAL at 04:39

## 2017-03-25 RX ADMIN — GABAPENTIN 400 MG: 100 CAPSULE ORAL at 15:42

## 2017-03-25 RX ADMIN — LOSARTAN POTASSIUM 100 MG: 100 TABLET, FILM COATED ORAL at 21:09

## 2017-03-25 RX ADMIN — RANITIDINE 75 MG: 75 TABLET, FILM COATED ORAL at 21:09

## 2017-03-25 RX ADMIN — OXYCODONE HYDROCHLORIDE 10 MG: 5 SOLUTION ORAL at 14:37

## 2017-03-25 RX ADMIN — SIMETHICONE 133 MG: 20 EMULSION ORAL at 21:10

## 2017-03-25 NOTE — PLAN OF CARE
Problem: Goal Outcome Summary  Goal: Goal Outcome Summary  Outcome: No Change  VSS. Flat affect. Up SBA. Tolerating PO clears.Boarderline UOP  Hespan dose given. Pain managed with oxycodone. Shah to stay in overnight. Strict I/O. Lap sites CDI, steri strips/bandaids. IS encouraged. Will continue to monitor.

## 2017-03-25 NOTE — PROGRESS NOTES
"General Surgery Progress Note    Date of service: 3/25/2017    Subjective: C/o mild to moderate abdominal pain, difficulty taking deep breaths with intermittent desats overnight    Objective:  /84 (BP Location: Right arm)  Pulse 89  Temp 98.8  F (37.1  C) (Oral)  Resp 16  Ht 1.626 m (5' 4\")  Wt 117.9 kg (260 lb)  LMP  (LMP Unknown)  SpO2 93%  BMI 44.63 kg/m2  GEN: NAD  PULM: breathing comfortably  ABD: soft, minimally tender, unable to eval for tenderness  EXTR: normal      Assessment and Plan:  Saundra Franks is a 31 yof POD#2 s/p RYGB, passed UGI yesterday, starting full liquids today. C/o Pain and some SOB today.  - continue full liquids  - continue PRN pain meds  - decrease IVFs and gentle diuresis today  - possible DC home later today if feeling better      Britany Bernal MD, PGY4  [phone]      "

## 2017-03-25 NOTE — PLAN OF CARE
Problem: Goal Outcome Summary  Goal: Goal Outcome Summary  Outcome: No Change  A&O. AVSS. O2 sat dropped early in shift  to mid 80s on RA, IS and ambulation encouraged.  Lap sites bob w/steri strips.  Abdominal binder in place.  BS active, BM this AM, voiding well.  Pain rated 8/10 managed with 10 mg oxycodone liquid q 3 hours.  Will d/c tomorrow 3/26.

## 2017-03-25 NOTE — PLAN OF CARE
Problem: Goal Outcome Summary  Goal: Goal Outcome Summary  Outcome: No Change  VSS. A&OX4. SBA. Clear liquid diet. Lap sites BELLA c steri strips CDI, band aides removed, scant dried sanguinous drainage, abd binder in place. U/O adequate via wilson. Oxycodone for pain, nicola well. BM on shift. Flatus +. IS to 1200. Flat affect.

## 2017-03-26 VITALS
DIASTOLIC BLOOD PRESSURE: 83 MMHG | BODY MASS INDEX: 44.39 KG/M2 | TEMPERATURE: 98.4 F | SYSTOLIC BLOOD PRESSURE: 114 MMHG | HEART RATE: 77 BPM | RESPIRATION RATE: 16 BRPM | HEIGHT: 64 IN | OXYGEN SATURATION: 95 % | WEIGHT: 260 LBS

## 2017-03-26 LAB
CREAT SERPL-MCNC: 0.54 MG/DL (ref 0.52–1.04)
GFR SERPL CREATININE-BSD FRML MDRD: NORMAL ML/MIN/1.7M2
GLUCOSE BLDC GLUCOMTR-MCNC: 129 MG/DL (ref 70–99)
GLUCOSE BLDC GLUCOMTR-MCNC: 142 MG/DL (ref 70–99)
GLUCOSE BLDC GLUCOMTR-MCNC: 149 MG/DL (ref 70–99)
PLATELET # BLD AUTO: 219 10E9/L (ref 150–450)

## 2017-03-26 PROCEDURE — 25000128 H RX IP 250 OP 636: Performed by: PHYSICIAN ASSISTANT

## 2017-03-26 PROCEDURE — 85049 AUTOMATED PLATELET COUNT: CPT | Performed by: PHYSICIAN ASSISTANT

## 2017-03-26 PROCEDURE — 00000146 ZZHCL STATISTIC GLUCOSE BY METER IP

## 2017-03-26 PROCEDURE — 36415 COLL VENOUS BLD VENIPUNCTURE: CPT | Performed by: SURGERY

## 2017-03-26 PROCEDURE — 36415 COLL VENOUS BLD VENIPUNCTURE: CPT | Performed by: PHYSICIAN ASSISTANT

## 2017-03-26 PROCEDURE — 25000132 ZZH RX MED GY IP 250 OP 250 PS 637: Performed by: SURGERY

## 2017-03-26 PROCEDURE — 82565 ASSAY OF CREATININE: CPT | Performed by: SURGERY

## 2017-03-26 PROCEDURE — 25000132 ZZH RX MED GY IP 250 OP 250 PS 637: Performed by: PHYSICIAN ASSISTANT

## 2017-03-26 RX ADMIN — OXYCODONE HYDROCHLORIDE 10 MG: 5 SOLUTION ORAL at 07:46

## 2017-03-26 RX ADMIN — OXYCODONE HYDROCHLORIDE 10 MG: 5 SOLUTION ORAL at 10:35

## 2017-03-26 RX ADMIN — OXYCODONE HYDROCHLORIDE 10 MG: 5 SOLUTION ORAL at 00:24

## 2017-03-26 RX ADMIN — ENOXAPARIN SODIUM 40 MG: 40 INJECTION SUBCUTANEOUS at 09:18

## 2017-03-26 RX ADMIN — GABAPENTIN 400 MG: 100 CAPSULE ORAL at 10:33

## 2017-03-26 RX ADMIN — INSULIN ASPART 1 UNITS: 100 INJECTION, SOLUTION INTRAVENOUS; SUBCUTANEOUS at 00:24

## 2017-03-26 RX ADMIN — BUSPIRONE HYDROCHLORIDE 10 MG: 10 TABLET ORAL at 10:32

## 2017-03-26 RX ADMIN — RANITIDINE 75 MG: 75 TABLET, FILM COATED ORAL at 09:19

## 2017-03-26 RX ADMIN — OXYCODONE HYDROCHLORIDE 10 MG: 5 SOLUTION ORAL at 04:15

## 2017-03-26 RX ADMIN — DULOXETINE HYDROCHLORIDE 60 MG: 20 CAPSULE, DELAYED RELEASE ORAL at 09:23

## 2017-03-26 RX ADMIN — ACETAMINOPHEN 975 MG: 325 TABLET, FILM COATED ORAL at 06:37

## 2017-03-26 RX ADMIN — INSULIN ASPART 1 UNITS: 100 INJECTION, SOLUTION INTRAVENOUS; SUBCUTANEOUS at 04:15

## 2017-03-26 RX ADMIN — SIMETHICONE 133 MG: 20 EMULSION ORAL at 09:17

## 2017-03-26 NOTE — PLAN OF CARE
Problem: Goal Outcome Summary  Goal: Goal Outcome Summary  Discharge instructions and meds given to pt. Awaiting ride home. Alexia Encinas RN

## 2017-03-26 NOTE — DISCHARGE SUMMARY
[facility] Discharge Summary    Saundra Franks MRN# [id number]   Age: 31 year old YOB: 1985     Date of Admission:  3/23/2017  Date of Discharge:  3/26/2017 12:23 PM  Admitting Provider:  Benson Chavez MD  Discharge Provider:  Britany Bernal MD  Discharging Service: General Surgery     Primary Provider: Paulo Martin  Primary Care Physician Phone Number: [phone]          Admission Diagnoses:   Principle Diagnosis: Morbid obesity with BMI of 40.0-44.9, adult (H) [E66.01, Z68.41]  Secondary Diagnoses:          Discharge Diagnosis:   MORBID OBESITY           Procedures:   Procedure(s): 3/23/2017 Laparoscopic RYGB            Discharge Medications:     Discharge Medication List as of 3/26/2017 10:56 AM      START taking these medications    Details   ondansetron (ZOFRAN-ODT) 4 MG ODT tab Take 1 tablet (4 mg) by mouth every 6 hours as needed for nausea, Disp-20 tablet, R-0, E-Prescribe      ranitidine (ZANTAC) 75 MG tablet Take 1 tablet (75 mg) by mouth 2 times daily, Disp-60 tablet, R-2, E-Prescribe      oxyCODONE (ROXICODONE) 5 MG/5ML solution Take 5-10 mLs (5-10 mg) by mouth every 3 hours as needed for moderate to severe pain, Disp-300 mL, R-0, Local Print         CONTINUE these medications which have NOT CHANGED    Details   DULoxetine HCl (CYMBALTA PO) Take 60 mg by mouth daily, Historical      SUMATRIPTAN SUCCINATE PO Take 100 mg by mouth every 2 hours as needed for migraine (Max 200mg in 24hours), Historical      GABAPENTIN PO Take 400 mg by mouth 3 times daily, Historical      LOSARTAN POTASSIUM PO Take 100 mg by mouth every evening, Historical      METFORMIN HCL PO Take 1,000 mg by mouth 2 times daily (with meals), Historical      BUSPIRONE HCL PO Take 10 mg by mouth 2 times daily, Historical      CALCIUM PO Take 1 tablet by mouth 3 times daily, Historical      VITAMIN D, CHOLECALCIFEROL, PO Take 1 tablet by mouth daily, Historical      Acetaminophen (TYLENOL PO) Take  "325-650 mg by mouth every 6 hours as needed for mild pain or fever, Historical      multivitamin, therapeutic with minerals (MULTI-VITAMIN) TABS Take 2 tablets by mouth daily Herbalife, Historical      insulin syringe-needle U-100 30G X 5/16\" 1 ML Historical      NOVOLOG MIX 70/30 FLEXPEN (70-30) 100 UNIT/ML susp Inject 0-60 Units Subcutaneous 2 times daily as needed for high blood sugar Checks BG 2-3 times a day  If BG is more than 130, estimates and administers 2 to 60 units.  (Taking differently than prescribed; Prescribed as 50 units twice daily), R-0, Histor ical      ONE TOUCH ULTRA test strip R-3Historical      B-D U/F 31G X 8 MM insulin pen needle R-3Historical      ONE TOUCH LANCETS MISC R-2, Historical                 Allergies:         Allergies   Allergen Reactions     Shellfish-Derived Products Itching     Adhesive Tape Rash     Contrast Dye Rash     Itching, angioedema     Fentanyl Rash     Only with fentanyl patch             Brief History of Illness:   Saundra Franks is a 31 year old female who presented with morbid obesity and was deemed to be a good candidate for weigh loss surgery.    After discussing the risks, benefits, and possible complications, informed consent was obtained and the patient underwent the above procedure.  There were no complications.  Please see the Operative Report for full details.           Hospital Course:   Saundra Franks's hospital course was unremarkable.  She recovered as anticipated and experienced no post-operative complications. She pass her UGI, was able to tolerate a bariatric diet and was discharged home in stable condition on POD#3.    On the date of discharge, the patient was discharged to home in stable condition and afebrile.  She verbalized understanding of all discharge instructions and felt comfortable with the discharge plan.  She was asked to call with any further questions or concerns.         Condition on Discharge:      Discharge condition: " "Stable   Discharge vitals: Blood pressure 114/83, pulse 77, temperature 98.4  F (36.9  C), temperature source Oral, resp. rate 16, height 1.626 m (5' 4\"), weight 117.9 kg (260 lb), SpO2 95 %, not currently breastfeeding.           Discharge Disposition:   Discharged to home          Discharge Instructions and Follow-Up:      Saundra Franks was asked to follow up with surgical team in 1-2 weeks.      Britany Bernal MD, PGY4  [phone]     "

## 2017-03-26 NOTE — DISCHARGE INSTRUCTIONS
For informational purposes only. Not to replace the advice of your health care provider. Copyright   2006 [facility]. All rights reserved. [employer] [id number] - REV 09/14  After Bariatric Surgery  [facility]  Note: Before you go home, ask your nurse to order your pain medicine from the pharmacy. Be sure you have your medicine with you when you leave.  How much fluid should I drink?    Drink at least 1 ounce of fluid every 15 minutes (1/2 cup per hour) during the day.     Carry a water bottle with you. Drink from it often.    Keep track of how much fluid you drink in a day.      Remember:  - Do not use straws, chew gum or suck on hard candies. They may cause painful gas.   - No cold drinks.  - No coffee, soda pop or drinks with caffeine. These may cause stomach pain.  - No alcohol. It is bad for your liver and will cause stomach pain. It also adds a lot of calories.  What can I do for pain control?  ? Take the prescribed liquid pain medicine for up to 2 weeks. Do not drive while you are taking this medicine. This is dangerous.  ? You may also take liquid Tylenol (acetaminophen) for pain in place of the prescribed pain medicine. Do not take Advil (ibuprofen). It will increase your risk for bleeding.  ? If your doctor prescribes Zantac, take it as ordered. Take it for 3 months to prevent ulcers.  ? If you took an antacid before you had surgery, keep taking it for 3 months after surgery. This will help prevent ulcers.  ? Take any other medicines that your doctor tells you to take.  ? Wear your binder to support your belly muscles.          How much rest do I need?  Get plenty of rest the first few days after surgery. Balance rest and activity.  Do not nap more than one hour during the day. Set a timer to wake yourself up, if needed. Too much sleep will keep you from drinking enough fluid during the day.  What should I know about my incisions (cuts)?    Call your doctor if you have  any of these signs of infection:   - Redness around the site.   - Drainage that smells bad.   - Fever of 101  F (38.3  C) or higher when taken under the tongue.- Chills.  If you have a drain:\  Will my urine or bowel movements change?   You might not have a bowel movement for several days after surgery. Your first bowel movements will likely be liquid.   Gastric bypass or sleeve gastrectomy: You may also notice old blood or a darker color in your stools (bowel movements).   Your urine should be clear to light yellow. This shows that you are drinking enough fluid. You should urinate (pass water) at least 2 to 3 times during the day. If not, call us.  What kind of activity is safe?  For 4 weeks after surgery:     Walk for a short time every day.    Do not jog or run.    No weight lifting or belly exercises.  No swimming, baths or hot tubs until your cuts are healed (scabs are gone). You may shower.  No outside activity in hot, humid weather until you can drink 48 to 64 ounces of fluid in 24 hours. If you sweat a lot, your body may lose too much water.   The first month after surgery, do not take any trips where you must sit for a long time. You could get a blood clot in your legs.  When to call the clinic  Call the clinic at [phone] if:    Your pain medicine is not working.    You do not urinate (pass water) 2 to 3 times per day.    You have any signs of infection.    You have belly pain that gets worse and worse.    You have swollen legs with pain behind the knee or calf.    You have chest pain or feel very short of breath.    You have any questions or concerns.    You have a sudden severe increase in heart rate.    You have vomiting that gets worse and worse.  When should I go back to the clinic?  Time Gastric bypass o    Week 1 See the physician or physician assistant (PA).    Week 2 See the nurse and dietitian.    Week 4 Have a nutrition consult with the dietitian.    Week 6     For the first year (or until  your BMI is less than 30) .

## 2017-03-26 NOTE — PLAN OF CARE
Problem: Goal Outcome Summary  Goal: Goal Outcome Summary  Outcome: Improving  VSS, A&OX4, flat affect. Up ind. Voiding. BS normoactive. Flatus-. Abd incisions BELLA c steri strips CDI, abd binder in place, heat applied. C/O abd pain, oxycodone and tylenol given, nicola well. IS to 1200.

## 2017-03-26 NOTE — PLAN OF CARE
Problem: Goal Outcome Summary  Goal: Goal Outcome Summary  Outcome: Improving  A/O, VSS, up independently, BS, no flatus, steri strips intact WDL, abd binder in place, heat pack applied for comfort, oxycodone for pain, cpap at noc, IS 1250 done independently. Plan to d/c tomorrow.

## 2017-03-26 NOTE — PROGRESS NOTES
Surgery    Doing much better today. O2 sats stable. Pain controlled. No fevers or tachycardia.    Abdomen-soft with minimal tenderness.    A/P doing great. No concerns at this time. Okay to D/C home.    Akhil Stoner M.D.  [facility]  [phone]

## 2017-03-28 ENCOUNTER — OFFICE VISIT (OUTPATIENT)
Dept: SURGERY | Facility: CLINIC | Age: 32
End: 2017-03-28
Payer: COMMERCIAL

## 2017-03-28 VITALS
OXYGEN SATURATION: 97 % | TEMPERATURE: 97.7 F | SYSTOLIC BLOOD PRESSURE: 134 MMHG | DIASTOLIC BLOOD PRESSURE: 80 MMHG | HEART RATE: 78 BPM | RESPIRATION RATE: 16 BRPM | WEIGHT: 272.8 LBS | BODY MASS INDEX: 46.83 KG/M2

## 2017-03-28 DIAGNOSIS — Z98.84 BARIATRIC SURGERY STATUS: Primary | ICD-10-CM

## 2017-03-28 DIAGNOSIS — E66.01 MORBID OBESITY (H): ICD-10-CM

## 2017-03-28 DIAGNOSIS — G89.18 ACUTE POST-OPERATIVE PAIN: ICD-10-CM

## 2017-03-28 PROCEDURE — 99024 POSTOP FOLLOW-UP VISIT: CPT | Performed by: PHYSICIAN ASSISTANT

## 2017-03-28 PROCEDURE — 99207 ZZC NO CHARGE NURSE ONLY: CPT

## 2017-03-28 RX ORDER — OXYCODONE HCL 5 MG/5 ML
5 SOLUTION, ORAL ORAL EVERY 6 HOURS PRN
Qty: 150 ML | Refills: 0 | Status: SHIPPED | OUTPATIENT
Start: 2017-03-28 | End: 2017-06-16

## 2017-03-28 NOTE — MR AVS SNAPSHOT
After Visit Summary   3/28/2017    Saundra Franks    MRN: [id number]           Patient Information     Date Of Birth          1985        Visit Information        Provider Department      3/28/2017 2:20 PM Sukhjinder Barton PA-C [facility]      Today's Diagnoses     Bariatric surgery status    -  1    Acute post-operative pain          Care Instructions    Rx for Roxicodone 150 mls provided to patient. Discussed supplementing with tylenol no more than 3000 mg daily. Can use heat while awake not while sleeping.   Continue with recommended antacid medication for the next 3 months.   Strive to drink 64 oz of fluid daily.  Strive to move hourly while awake to decrease risk of developing a blood clot.  Continue to do deep breathing exercises twice daily or use your spirometer.  Follow up with your primary care provider to discuss obesity related conditions including Diabetes by two weeks post op.   Start recommended postoperative vitamins within in the first 6 weeks.  Advance diet per Dietitian at your 2 week appointment.   Make follow up appointment to meet with psychologist at 1 and 3 months post operatively.   Wear binder to support abdominal muscles and gradually wean off over the next few weeks.   Return to clinic for 2 wk post op appointment and bring post op vitamins along.  Call with questions or concerns at any time.        Follow-ups after your visit        Your next 10 appointments already scheduled     Apr 06, 2017 11:00 AM CDT   Post Op with Jeyson Padgett Rn, RN   [facility] ([facility])    [address] [place] [address]  [place] [id number]   [phone]            Apr 06, 2017 11:30 AM CDT   Post Op with Jeyson Ford 3, RD   [facility] ([facility])    [address] [place] [address]  [address]  [place] [id number] [phone]            Apr 19, 2017  9:00 AM CDT   Post Op with Jeyson Padgett Diet 1, RD   [facility] ([facility])    [address] [place] [address] [address]  [place] [id number] [phone]              Who to contact     If you have questions or need follow up information about today's clinic visit or your schedule please contact [facility] directly at [phone].  Normal or non-critical lab and imaging results will be communicated to you by [employer], letter or phone within 4 business days after the clinic has received the results. If you do not hear from us within 7 days, please contact the clinic through [employer] or phone. If you have a critical or abnormal lab result, we will notify you by phone as soon as possible.  Submit refill requests through [employer] or call your pharmacy and they will forward the refill request to us. Please allow 3 business days for your refill to be completed.          Additional Information About Your Visit        [employer] Information     [employer] gives you secure access to your electronic health record. If you see a primary care provider, you can also send messages to your care team and make appointments. If you have questions, please call your primary care clinic.  If you do not have a primary care provider, please call [phone] and they will assist you.        Care EveryWhere ID     This is your Care EveryWhere ID. This could be used by other organizations to access your [facility] medical records  [id number]        Your Vitals Were     Pulse Temperature Respirations Last Period Pulse Oximetry Breastfeeding?    78 97.7  F (36.5  C) 16 (LMP Unknown) 97% No    BMI (Body Mass Index)                   46.83 kg/m2            Blood Pressure from Last 3 Encounters:   03/28/17 134/80   03/26/17 114/83   02/14/17 134/88    Weight from Last 3 Encounters:   03/28/17 272 lb 12.8  oz (123.7 kg)   03/23/17 260 lb (117.9 kg)   02/14/17 265 lb 3.2 oz (120.3 kg)              Today, you had the following     No orders found for display         Today's Medication Changes          These changes are accurate as of: 3/28/17  3:04 PM.  If you have any questions, ask your nurse or doctor.               These medicines have changed or have updated prescriptions.        Dose/Directions    oxyCODONE 5 MG/5ML solution   Commonly known as:  ROXICODONE   This may have changed:    - how much to take  - when to take this   Used for:  Bariatric surgery status, Acute post-operative pain   Changed by:  Sukhjinder Barton PA-C        Dose:  5 mg   Take 5 mLs (5 mg) by mouth every 6 hours as needed for moderate to severe pain   Quantity:  150 mL   Refills:  0            Where to get your medicines      Some of these will need a paper prescription and others can be bought over the counter.  Ask your nurse if you have questions.     Bring a paper prescription for each of these medications     oxyCODONE 5 MG/5ML solution                Primary Care Provider Office Phone # Fax #    Paulo Martin [phone]       [place] [address]        Thank you!     Thank you for choosing [facility]  for your care. Our goal is always to provide you with excellent care. Hearing back from our patients is one way we can continue to improve our services. Please take a few minutes to complete the written survey that you may receive in the mail after your visit with us. Thank you!             Your Updated Medication List - Protect others around you: Learn how to safely use, store and throw away your medicines at www.disposemymeds.org.          This list is accurate as of: 3/28/17  3:04 PM.  Always use your most recent med list.                   Brand Name Dispense Instructions for use    B-D U/F 31G X 8 MM   Generic drug:  insulin pen  "needle      Reported on 3/28/2017       BUSPIRONE HCL PO      Take 10 mg by mouth 2 times daily       CALCIUM PO      Take 1 tablet by mouth 3 times daily       CYMBALTA PO      Take 60 mg by mouth daily       GABAPENTIN PO      Take 400 mg by mouth 3 times daily       insulin syringe-needle U-100 30G X 5/16\" 1 ML      Reported on 3/28/2017       LOSARTAN POTASSIUM PO      Take 100 mg by mouth every evening       METFORMIN HCL PO      Take 1,000 mg by mouth 2 times daily (with meals) Reported on 3/28/2017       Multi-vitamin Tabs tablet      Take 2 tablets by mouth daily Herbalife       NovoLOG MIX 70/30 FLEXPEN injection   Generic drug:  insulin aspart prot & aspart      Inject 0-60 Units Subcutaneous 2 times daily as needed for high blood sugar Reported on 3/28/2017       ondansetron 4 MG ODT tab    ZOFRAN-ODT    20 tablet    Take 1 tablet (4 mg) by mouth every 6 hours as needed for nausea       ONE TOUCH LANCETS Misc          ONE TOUCH ULTRA test strip   Generic drug:  blood glucose monitoring          oxyCODONE 5 MG/5ML solution    ROXICODONE    150 mL    Take 5 mLs (5 mg) by mouth every 6 hours as needed for moderate to severe pain       ranitidine 75 MG tablet    ZANTAC    60 tablet    Take 1 tablet (75 mg) by mouth 2 times daily       SUMATRIPTAN SUCCINATE PO      Take 100 mg by mouth every 2 hours as needed for migraine Reported on 3/28/2017       TYLENOL PO      Take 325-650 mg by mouth every 12 hours       VITAMIN D (CHOLECALCIFEROL) PO      Take 1 tablet by mouth daily         "

## 2017-03-28 NOTE — PROGRESS NOTES
[facility]   1 Week Surgical Follow-Up     PCP:  Paulo Martin    DOS: 03/23/17  Surgeon: SHANTELLE  Surgery Type: Bypass  HISTORY OF PRESENT ILLNESS:  Saundra Franks returns today for her follow-up appointment status post bariatric surgery.  She is currently using Roxicodone for pain medication.  Taking 15 mls every 3 hours. Refill Needed: Yes.  Patient's Pain Scale: 8. The pain is located left side of abdomen.  Pain is at rest but worse with movement.  Taking her anxiety medications and is not feeling depressed or anxious.  Patient's current daily fluid intake in oz is:  (50-55 ounces).  Patient has started postoperative Vitamins: Yes. Taking zantac twice daily.  Activity:   Activity:  (Walking around house every 2 hours for 5 minutes)  REVIEW OF SYSTEMS:  GI:    Nausea: No  Vomiting: No  Diarrhea: Yes  Constipation: No  Last BM:  (an hour ago - loose and brwn - several daily)  Dysphagia: No  GERD: No    CV/Pulmonary:   Chest Pain: No  Dizzy: No  Light Headed: No  SOB: No  Endo:  Diabetes: Yes - Has been taking blood sugars and they range 120-140.  Has stopped metformin and insulin.  Has not been in contact with primary yet.  Denies feeling of hypoglycemia.  :    Contraception:  (hyst)  Dysuria: No  Vascular:    LE Edema: Yes  PHYSICAL EXAMINATION:    /80 (BP Location: Left arm, Patient Position: Chair, Cuff Size: Adult Large)  Pulse 78  Temp 97.7  F (36.5  C)  Resp 16  Wt 272 lb 12.8 oz (123.7 kg)  LMP  (LMP Unknown)  SpO2 97%  Breastfeeding? No  BMI 46.83 kg/m2     GENERAL: No acute distress. Alert and oriented time 3.  HEART: Regular rate and rhythm.  LUNGS:  Clear to auscultation bilaterally.  ABDOMEN: Soft, incisions clean,dry, and intact. Tenderness WNL for post op.  EXTREMITIES: No lower extremity edema bilaterally. No calf swelling or tenderness  SKIN: No rashes.  PSYCHOLOGICAL: Stable. Pleasant      ASSESSMENT:    1. S/P bariatric surgery.  2. Post surgical  malabsorption  3. Diabetes    PLAN:   Rx for Roxicodone 150 mls provided to patient.  Discussed supplementing with tylenol no more than 3000 mg daily.  Can use heat while awake not while sleeping.   Continue with recommended antacid medication for the next 3 months.   Strive to drink 64 oz of fluid daily.  Strive to move hourly while awake to decrease risk of developing a blood clot.  Continue to do deep breathing exercises twice daily or use your spirometer.  Follow up with your primary care provider to discuss obesity related conditions including Diabetes by two weeks post op.   Start recommended postoperative vitamins within in the first 6 weeks.  Advance diet per Dietitian at your 2 week appointment.   Make follow up appointment to meet with psychologist at 1 and 3 months post operatively.   Wear binder to support abdominal muscles and gradually wean off over the next few weeks.   Return to clinic for 2 wk post op appointment and bring post op vitamins along.  Call with questions or concerns at any time.

## 2017-03-28 NOTE — PATIENT INSTRUCTIONS
Rx for Roxicodone 150 mls provided to patient. Discussed supplementing with tylenol no more than 3000 mg daily. Can use heat while awake not while sleeping.   Continue with recommended antacid medication for the next 3 months.   Strive to drink 64 oz of fluid daily.  Strive to move hourly while awake to decrease risk of developing a blood clot.  Continue to do deep breathing exercises twice daily or use your spirometer.  Follow up with your primary care provider to discuss obesity related conditions including Diabetes by two weeks post op.   Start recommended postoperative vitamins within in the first 6 weeks.  Advance diet per Dietitian at your 2 week appointment.   Make follow up appointment to meet with psychologist at 1 and 3 months post operatively.   Wear binder to support abdominal muscles and gradually wean off over the next few weeks.   Return to clinic for 2 wk post op appointment and bring post op vitamins along.  Call with questions or concerns at any time.

## 2017-03-28 NOTE — MR AVS SNAPSHOT
After Visit Summary   3/28/2017    Saundra Franks    MRN: [id number]           Patient Information     Date Of Birth          1985        Visit Information        Provider Department      3/28/2017 2:00 PM Rn, Jeyson Padgett, RN [facility]      Today's Diagnoses     Bariatric surgery status    -  1    Morbid obesity (H)           Follow-ups after your visit        Your next 10 appointments already scheduled     Apr 06, 2017 11:00 AM CDT   Post Op with Jeyson Padgett Rn, RN   [facility] ([facility])    [facility] [facility]  [facility] [address]   [phone]            Apr 06, 2017 11:30 AM CDT   Post Op with Jeyson Padgett Diet 3, RD   [facility] ([facility])    [address] [place], [place] [id number]  [facility] [address] [phone]            Apr 19, 2017  9:00 AM CDT   Post Op with Jeyson Padgett Diet 1, RD   [facility] ([facility])    [address] [place], [place] [address]  [facility] [address] [phone]              Who to contact     If you have questions or need follow up information about today's clinic visit or your schedule please contact [facility] directly at [phone].  Normal or non-critical lab and imaging results will be communicated to you by MyChart, letter or phone within 4 business days after the clinic has received the results. If you do not hear from us within 7 days, please contact the clinic through MyChart or phone. If you have a critical or abnormal lab result, we will notify you by phone as soon as possible.  Submit refill requests through [employer] or call your pharmacy and they will forward the refill request to us. Please allow 3 business days for your refill to be completed.          Additional  Information About Your Visit        [employer] Information     [employer] gives you secure access to your electronic health record. If you see a primary care provider, you can also send messages to your care team and make appointments. If you have questions, please call your primary care clinic.  If you do not have a primary care provider, please call [phone] and they will assist you.        Care EveryWhere ID     This is your Care EveryWhere ID. This could be used by other organizations to access your [facility] medical records  [id number]        Your Vitals Were     Last Period                   (LMP Unknown)            Blood Pressure from Last 3 Encounters:   03/28/17 134/80   03/26/17 114/83   02/14/17 134/88    Weight from Last 3 Encounters:   03/28/17 272 lb 12.8 oz (123.7 kg)   03/23/17 260 lb (117.9 kg)   02/14/17 265 lb 3.2 oz (120.3 kg)              Today, you had the following     No orders found for display       Primary Care Provider Office Phone # Fax #    Paulo Martin [phone]       [place] [address]        Thank you!     Thank you for choosing [facility]  for your care. Our goal is always to provide you with excellent care. Hearing back from our patients is one way we can continue to improve our services. Please take a few minutes to complete the written survey that you may receive in the mail after your visit with us. Thank you!             Your Updated Medication List - Protect others around you: Learn how to safely use, store and throw away your medicines at www.disposemymeds.org.          This list is accurate as of: 3/28/17  2:22 PM.  Always use your most recent med list.                   Brand Name Dispense Instructions for use    B-D U/F 31G X 8 MM   Generic drug:  insulin pen needle      Reported on 3/28/2017       BUSPIRONE HCL PO      Take 10 mg by mouth 2 times daily       CALCIUM PO     "  Take 1 tablet by mouth 3 times daily       CYMBALTA PO      Take 60 mg by mouth daily       GABAPENTIN PO      Take 400 mg by mouth 3 times daily       insulin syringe-needle U-100 30G X 5/16\" 1 ML      Reported on 3/28/2017       LOSARTAN POTASSIUM PO      Take 100 mg by mouth every evening       METFORMIN HCL PO      Take 1,000 mg by mouth 2 times daily (with meals) Reported on 3/28/2017       Multi-vitamin Tabs tablet      Take 2 tablets by mouth daily [employer]       NovoLOG MIX 70/30 FLEXPEN injection   Generic drug:  insulin aspart prot & aspart      Inject 0-60 Units Subcutaneous 2 times daily as needed for high blood sugar Reported on 3/28/2017       ondansetron 4 MG ODT tab    ZOFRAN-ODT    20 tablet    Take 1 tablet (4 mg) by mouth every 6 hours as needed for nausea       ONE TOUCH LANCETS Misc          ONE TOUCH ULTRA test strip   Generic drug:  blood glucose monitoring          oxyCODONE 5 MG/5ML solution    ROXICODONE    300 mL    Take 5-10 mLs (5-10 mg) by mouth every 3 hours as needed for moderate to severe pain       ranitidine 75 MG tablet    ZANTAC    60 tablet    Take 1 tablet (75 mg) by mouth 2 times daily       SUMATRIPTAN SUCCINATE PO      Take 100 mg by mouth every 2 hours as needed for migraine Reported on 3/28/2017       TYLENOL PO      Take 325-650 mg by mouth every 12 hours       VITAMIN D (CHOLECALCIFEROL) PO      Take 1 tablet by mouth daily         "

## 2017-04-01 ENCOUNTER — APPOINTMENT (OUTPATIENT)
Dept: CT IMAGING | Facility: CLINIC | Age: 32
End: 2017-04-01
Attending: EMERGENCY MEDICINE
Payer: COMMERCIAL

## 2017-04-01 ENCOUNTER — HOSPITAL ENCOUNTER (OUTPATIENT)
Facility: CLINIC | Age: 32
Setting detail: OBSERVATION
Discharge: HOME | End: 2017-04-02
Attending: EMERGENCY MEDICINE | Admitting: SURGERY
Payer: COMMERCIAL

## 2017-04-01 DIAGNOSIS — Z98.84 BARIATRIC SURGERY STATUS: ICD-10-CM

## 2017-04-01 DIAGNOSIS — K92.2 GASTROINTESTINAL HEMORRHAGE, UNSPECIFIED GASTROINTESTINAL HEMORRHAGE TYPE: ICD-10-CM

## 2017-04-01 DIAGNOSIS — K92.1 GASTROINTESTINAL HEMORRHAGE WITH MELENA: ICD-10-CM

## 2017-04-01 DIAGNOSIS — D62 ANEMIA DUE TO BLOOD LOSS, ACUTE: ICD-10-CM

## 2017-04-01 DIAGNOSIS — R73.9 HYPERGLYCEMIA: ICD-10-CM

## 2017-04-01 DIAGNOSIS — R10.84 ABDOMINAL PAIN, GENERALIZED: ICD-10-CM

## 2017-04-01 LAB
ABO + RH BLD: NORMAL
ABO + RH BLD: NORMAL
ANION GAP SERPL CALCULATED.3IONS-SCNC: 11 MMOL/L (ref 3–14)
BASOPHILS # BLD AUTO: 0 10E9/L (ref 0–0.2)
BASOPHILS NFR BLD AUTO: 0.3 %
BLD GP AB SCN SERPL QL: NORMAL
BLOOD BANK CMNT PATIENT-IMP: NORMAL
BUN SERPL-MCNC: 14 MG/DL (ref 7–30)
CALCIUM SERPL-MCNC: 8.7 MG/DL (ref 8.5–10.1)
CHLORIDE SERPL-SCNC: 105 MMOL/L (ref 94–109)
CO2 SERPL-SCNC: 26 MMOL/L (ref 20–32)
CREAT SERPL-MCNC: 0.66 MG/DL (ref 0.52–1.04)
DIFFERENTIAL METHOD BLD: ABNORMAL
EOSINOPHIL # BLD AUTO: 0.1 10E9/L (ref 0–0.7)
EOSINOPHIL NFR BLD AUTO: 1.5 %
ERYTHROCYTE [DISTWIDTH] IN BLOOD BY AUTOMATED COUNT: 14.6 % (ref 10–15)
GFR SERPL CREATININE-BSD FRML MDRD: ABNORMAL ML/MIN/1.7M2
GLUCOSE SERPL-MCNC: 175 MG/DL (ref 70–99)
HCT VFR BLD AUTO: 26.5 % (ref 35–47)
HEMOCCULT STL QL: POSITIVE
HGB BLD-MCNC: 8.8 G/DL (ref 11.7–15.7)
IMM GRANULOCYTES # BLD: 0.2 10E9/L (ref 0–0.4)
IMM GRANULOCYTES NFR BLD: 2 %
LYMPHOCYTES # BLD AUTO: 1.7 10E9/L (ref 0.8–5.3)
LYMPHOCYTES NFR BLD AUTO: 21.3 %
MCH RBC QN AUTO: 28.8 PG (ref 26.5–33)
MCHC RBC AUTO-ENTMCNC: 33.2 G/DL (ref 31.5–36.5)
MCV RBC AUTO: 87 FL (ref 78–100)
MONOCYTES # BLD AUTO: 0.7 10E9/L (ref 0–1.3)
MONOCYTES NFR BLD AUTO: 8.1 %
NEUTROPHILS # BLD AUTO: 5.3 10E9/L (ref 1.6–8.3)
NEUTROPHILS NFR BLD AUTO: 66.8 %
NRBC # BLD AUTO: 0 10*3/UL
NRBC BLD AUTO-RTO: 0 /100
PLATELET # BLD AUTO: 317 10E9/L (ref 150–450)
POTASSIUM SERPL-SCNC: 3.7 MMOL/L (ref 3.4–5.3)
RBC # BLD AUTO: 3.06 10E12/L (ref 3.8–5.2)
SODIUM SERPL-SCNC: 142 MMOL/L (ref 133–144)
SPECIMEN EXP DATE BLD: NORMAL
WBC # BLD AUTO: 8 10E9/L (ref 4–11)

## 2017-04-01 PROCEDURE — 99285 EMERGENCY DEPT VISIT HI MDM: CPT | Mod: 25

## 2017-04-01 PROCEDURE — 25800025 ZZH RX 258: Performed by: SURGERY

## 2017-04-01 PROCEDURE — G0378 HOSPITAL OBSERVATION PER HR: HCPCS

## 2017-04-01 PROCEDURE — 25000128 H RX IP 250 OP 636: Performed by: EMERGENCY MEDICINE

## 2017-04-01 PROCEDURE — 86900 BLOOD TYPING SEROLOGIC ABO: CPT | Performed by: EMERGENCY MEDICINE

## 2017-04-01 PROCEDURE — 86901 BLOOD TYPING SEROLOGIC RH(D): CPT | Performed by: EMERGENCY MEDICINE

## 2017-04-01 PROCEDURE — 99219 ZZC INITIAL OBSERVATION CARE,LEVL II: CPT | Performed by: INTERNAL MEDICINE

## 2017-04-01 PROCEDURE — 99207 ZZC CONSULT E&M CHANGED TO INITIAL LEVEL: CPT | Performed by: INTERNAL MEDICINE

## 2017-04-01 PROCEDURE — 96374 THER/PROPH/DIAG INJ IV PUSH: CPT

## 2017-04-01 PROCEDURE — 80048 BASIC METABOLIC PNL TOTAL CA: CPT | Performed by: EMERGENCY MEDICINE

## 2017-04-01 PROCEDURE — 86850 RBC ANTIBODY SCREEN: CPT | Performed by: EMERGENCY MEDICINE

## 2017-04-01 PROCEDURE — 96361 HYDRATE IV INFUSION ADD-ON: CPT

## 2017-04-01 PROCEDURE — 74176 CT ABD & PELVIS W/O CONTRAST: CPT

## 2017-04-01 PROCEDURE — 96375 TX/PRO/DX INJ NEW DRUG ADDON: CPT

## 2017-04-01 PROCEDURE — 25000132 ZZH RX MED GY IP 250 OP 250 PS 637: Performed by: INTERNAL MEDICINE

## 2017-04-01 PROCEDURE — 82272 OCCULT BLD FECES 1-3 TESTS: CPT | Performed by: EMERGENCY MEDICINE

## 2017-04-01 PROCEDURE — 85025 COMPLETE CBC W/AUTO DIFF WBC: CPT | Performed by: EMERGENCY MEDICINE

## 2017-04-01 PROCEDURE — 25000125 ZZHC RX 250: Performed by: EMERGENCY MEDICINE

## 2017-04-01 RX ORDER — ONDANSETRON 2 MG/ML
4 INJECTION INTRAMUSCULAR; INTRAVENOUS ONCE
Status: COMPLETED | OUTPATIENT
Start: 2017-04-01 | End: 2017-04-01

## 2017-04-01 RX ORDER — NALOXONE HYDROCHLORIDE 0.4 MG/ML
.1-.4 INJECTION, SOLUTION INTRAMUSCULAR; INTRAVENOUS; SUBCUTANEOUS
Status: DISCONTINUED | OUTPATIENT
Start: 2017-04-01 | End: 2017-04-02 | Stop reason: HOSPADM

## 2017-04-01 RX ORDER — ACETAMINOPHEN 325 MG/1
325-650 TABLET ORAL EVERY 12 HOURS
Status: DISCONTINUED | OUTPATIENT
Start: 2017-04-01 | End: 2017-04-02

## 2017-04-01 RX ORDER — BUSPIRONE HYDROCHLORIDE 10 MG/1
10 TABLET ORAL 2 TIMES DAILY
Status: DISCONTINUED | OUTPATIENT
Start: 2017-04-01 | End: 2017-04-02 | Stop reason: HOSPADM

## 2017-04-01 RX ORDER — ONDANSETRON 2 MG/ML
4 INJECTION INTRAMUSCULAR; INTRAVENOUS EVERY 6 HOURS PRN
Status: DISCONTINUED | OUTPATIENT
Start: 2017-04-01 | End: 2017-04-02 | Stop reason: HOSPADM

## 2017-04-01 RX ORDER — ONDANSETRON 4 MG/1
4 TABLET, ORALLY DISINTEGRATING ORAL EVERY 6 HOURS PRN
Status: DISCONTINUED | OUTPATIENT
Start: 2017-04-01 | End: 2017-04-02 | Stop reason: HOSPADM

## 2017-04-01 RX ORDER — NICOTINE POLACRILEX 4 MG
15-30 LOZENGE BUCCAL
Status: DISCONTINUED | OUTPATIENT
Start: 2017-04-01 | End: 2017-04-02 | Stop reason: HOSPADM

## 2017-04-01 RX ORDER — HYDROMORPHONE HYDROCHLORIDE 1 MG/ML
0.2 INJECTION, SOLUTION INTRAMUSCULAR; INTRAVENOUS; SUBCUTANEOUS
Status: DISCONTINUED | OUTPATIENT
Start: 2017-04-01 | End: 2017-04-02 | Stop reason: HOSPADM

## 2017-04-01 RX ORDER — GABAPENTIN 400 MG/1
400 CAPSULE ORAL 3 TIMES DAILY
Status: DISCONTINUED | OUTPATIENT
Start: 2017-04-02 | End: 2017-04-02 | Stop reason: HOSPADM

## 2017-04-01 RX ORDER — MULTIPLE VITAMINS W/ MINERALS TAB 9MG-400MCG
2 TAB ORAL DAILY
Status: DISCONTINUED | OUTPATIENT
Start: 2017-04-02 | End: 2017-04-02 | Stop reason: HOSPADM

## 2017-04-01 RX ORDER — DEXTROSE MONOHYDRATE 25 G/50ML
25-50 INJECTION, SOLUTION INTRAVENOUS
Status: DISCONTINUED | OUTPATIENT
Start: 2017-04-01 | End: 2017-04-02 | Stop reason: HOSPADM

## 2017-04-01 RX ORDER — DULOXETIN HYDROCHLORIDE 30 MG/1
60 CAPSULE, DELAYED RELEASE ORAL DAILY
Status: DISCONTINUED | OUTPATIENT
Start: 2017-04-02 | End: 2017-04-02 | Stop reason: HOSPADM

## 2017-04-01 RX ORDER — LIDOCAINE 40 MG/G
CREAM TOPICAL
Status: DISCONTINUED | OUTPATIENT
Start: 2017-04-01 | End: 2017-04-02 | Stop reason: HOSPADM

## 2017-04-01 RX ORDER — SODIUM CHLORIDE, SODIUM LACTATE, POTASSIUM CHLORIDE, CALCIUM CHLORIDE 600; 310; 30; 20 MG/100ML; MG/100ML; MG/100ML; MG/100ML
INJECTION, SOLUTION INTRAVENOUS CONTINUOUS
Status: DISCONTINUED | OUTPATIENT
Start: 2017-04-01 | End: 2017-04-02 | Stop reason: HOSPADM

## 2017-04-01 RX ADMIN — SODIUM CHLORIDE 1000 ML: 9 INJECTION, SOLUTION INTRAVENOUS at 22:56

## 2017-04-01 RX ADMIN — ACETAMINOPHEN 650 MG: 325 TABLET, FILM COATED ORAL at 23:27

## 2017-04-01 RX ADMIN — SODIUM CHLORIDE, POTASSIUM CHLORIDE, SODIUM LACTATE AND CALCIUM CHLORIDE: 600; 310; 30; 20 INJECTION, SOLUTION INTRAVENOUS at 23:41

## 2017-04-01 RX ADMIN — SODIUM CHLORIDE 1000 ML: 9 INJECTION, SOLUTION INTRAVENOUS at 21:02

## 2017-04-01 RX ADMIN — GABAPENTIN 400 MG: 400 CAPSULE ORAL at 23:28

## 2017-04-01 RX ADMIN — BUSPIRONE HYDROCHLORIDE 10 MG: 10 TABLET ORAL at 23:41

## 2017-04-01 RX ADMIN — PANTOPRAZOLE SODIUM 80 MG: 40 INJECTION, POWDER, FOR SOLUTION INTRAVENOUS at 23:27

## 2017-04-01 RX ADMIN — RANITIDINE 75 MG: 75 TABLET, FILM COATED ORAL at 23:41

## 2017-04-01 RX ADMIN — ONDANSETRON 4 MG: 2 SOLUTION INTRAMUSCULAR; INTRAVENOUS at 21:02

## 2017-04-01 ASSESSMENT — ENCOUNTER SYMPTOMS
FEVER: 0
DIZZINESS: 1
CHILLS: 0
VOMITING: 0
LIGHT-HEADEDNESS: 1
NAUSEA: 0
BLOOD IN STOOL: 1
DIARRHEA: 1
ABDOMINAL PAIN: 1

## 2017-04-02 VITALS
BODY MASS INDEX: 45.58 KG/M2 | DIASTOLIC BLOOD PRESSURE: 57 MMHG | TEMPERATURE: 98.2 F | RESPIRATION RATE: 18 BRPM | OXYGEN SATURATION: 96 % | HEIGHT: 64 IN | HEART RATE: 95 BPM | WEIGHT: 267 LBS | SYSTOLIC BLOOD PRESSURE: 109 MMHG

## 2017-04-02 LAB
GLUCOSE BLDC GLUCOMTR-MCNC: 132 MG/DL (ref 70–99)
GLUCOSE BLDC GLUCOMTR-MCNC: 149 MG/DL (ref 70–99)
GLUCOSE BLDC GLUCOMTR-MCNC: 153 MG/DL (ref 70–99)
HGB BLD-MCNC: 7.6 G/DL (ref 11.7–15.7)
HGB BLD-MCNC: 7.6 G/DL (ref 11.7–15.7)
HGB BLD-MCNC: 8 G/DL (ref 11.7–15.7)

## 2017-04-02 PROCEDURE — 00000146 ZZHCL STATISTIC GLUCOSE BY METER IP

## 2017-04-02 PROCEDURE — 36415 COLL VENOUS BLD VENIPUNCTURE: CPT | Performed by: SURGERY

## 2017-04-02 PROCEDURE — 99207 ZZC MOONLIGHTING INDICATOR: CPT | Performed by: PHYSICIAN ASSISTANT

## 2017-04-02 PROCEDURE — 85018 HEMOGLOBIN: CPT | Performed by: INTERNAL MEDICINE

## 2017-04-02 PROCEDURE — 96372 THER/PROPH/DIAG INJ SC/IM: CPT

## 2017-04-02 PROCEDURE — 96361 HYDRATE IV INFUSION ADD-ON: CPT

## 2017-04-02 PROCEDURE — 96376 TX/PRO/DX INJ SAME DRUG ADON: CPT

## 2017-04-02 PROCEDURE — 25000132 ZZH RX MED GY IP 250 OP 250 PS 637: Performed by: INTERNAL MEDICINE

## 2017-04-02 PROCEDURE — 85018 HEMOGLOBIN: CPT | Performed by: SURGERY

## 2017-04-02 PROCEDURE — 25000125 ZZHC RX 250: Performed by: SURGERY

## 2017-04-02 PROCEDURE — 25000132 ZZH RX MED GY IP 250 OP 250 PS 637: Performed by: PHYSICIAN ASSISTANT

## 2017-04-02 PROCEDURE — 36415 COLL VENOUS BLD VENIPUNCTURE: CPT | Performed by: INTERNAL MEDICINE

## 2017-04-02 PROCEDURE — G0378 HOSPITAL OBSERVATION PER HR: HCPCS

## 2017-04-02 PROCEDURE — 25800025 ZZH RX 258: Performed by: SURGERY

## 2017-04-02 PROCEDURE — 25000131 ZZH RX MED GY IP 250 OP 636 PS 637: Performed by: INTERNAL MEDICINE

## 2017-04-02 PROCEDURE — 99225 ZZC SUBSEQUENT OBSERVATION CARE,LEVEL II: CPT | Performed by: PHYSICIAN ASSISTANT

## 2017-04-02 PROCEDURE — 99207 ZZC CDG-CODE CATEGORY CHANGED: CPT | Performed by: PHYSICIAN ASSISTANT

## 2017-04-02 RX ORDER — SUMATRIPTAN 50 MG/1
100 TABLET, FILM COATED ORAL ONCE
Status: COMPLETED | OUTPATIENT
Start: 2017-04-02 | End: 2017-04-02

## 2017-04-02 RX ORDER — HYDROCODONE BITARTRATE AND ACETAMINOPHEN 5; 325 MG/1; MG/1
1-2 TABLET ORAL EVERY 4 HOURS PRN
Status: DISCONTINUED | OUTPATIENT
Start: 2017-04-02 | End: 2017-04-02 | Stop reason: HOSPADM

## 2017-04-02 RX ORDER — ACETAMINOPHEN 325 MG/1
650 TABLET ORAL EVERY 6 HOURS PRN
Status: DISCONTINUED | OUTPATIENT
Start: 2017-04-02 | End: 2017-04-02 | Stop reason: HOSPADM

## 2017-04-02 RX ADMIN — DULOXETINE HYDROCHLORIDE 60 MG: 30 CAPSULE, DELAYED RELEASE ORAL at 08:22

## 2017-04-02 RX ADMIN — SUMATRIPTAN 100 MG: 50 TABLET, FILM COATED ORAL at 04:17

## 2017-04-02 RX ADMIN — GABAPENTIN 400 MG: 400 CAPSULE ORAL at 08:22

## 2017-04-02 RX ADMIN — RANITIDINE 75 MG: 75 TABLET, FILM COATED ORAL at 08:22

## 2017-04-02 RX ADMIN — SODIUM CHLORIDE, POTASSIUM CHLORIDE, SODIUM LACTATE AND CALCIUM CHLORIDE: 600; 310; 30; 20 INJECTION, SOLUTION INTRAVENOUS at 10:02

## 2017-04-02 RX ADMIN — MULTIPLE VITAMINS W/ MINERALS TAB 2 TABLET: TAB at 08:22

## 2017-04-02 RX ADMIN — HYDROCODONE BITARTRATE AND ACETAMINOPHEN 2 TABLET: 5; 325 TABLET ORAL at 09:04

## 2017-04-02 RX ADMIN — BUSPIRONE HYDROCHLORIDE 10 MG: 10 TABLET ORAL at 08:23

## 2017-04-02 RX ADMIN — VITAMIN D, TAB 1000IU (100/BT) 1000 UNITS: 25 TAB at 08:23

## 2017-04-02 RX ADMIN — PANTOPRAZOLE SODIUM 40 MG: 40 INJECTION, POWDER, FOR SOLUTION INTRAVENOUS at 08:23

## 2017-04-02 RX ADMIN — GABAPENTIN 400 MG: 400 CAPSULE ORAL at 13:57

## 2017-04-02 RX ADMIN — INSULIN ASPART 1 UNITS: 100 INJECTION, SOLUTION INTRAVENOUS; SUBCUTANEOUS at 14:06

## 2017-04-02 ASSESSMENT — PAIN DESCRIPTION - DESCRIPTORS: DESCRIPTORS: HEADACHE

## 2017-04-02 NOTE — PHARMACY-ADMISSION MEDICATION HISTORY
Admission medication history interview status for the 4/1/2017  admission is complete. See EPIC admission navigator for prior to admission medications     Medication history source reliability:Good    Actions taken by pharmacist (provider contacted, etc):Discussed PTA med list with patient.      Additional medication history information not noted on PTA med list :Patient has not taken metformin or used insulin since before her Anamaria-en-Y procedure    Medication reconciliation/reorder completed by provider prior to medication history? Yes    Time spent in this activity: 20 minutes     Prior to Admission medications    Medication Sig Last Dose Taking? Auth Provider   insulin aspart prot & aspart (NOVOLOG MIX 70/30 FLEXPEN) injection Inject 60 Units Subcutaneous 2 times daily (before meals) Past Week at Unknown time Yes Unknown, Entered By History   oxyCODONE (ROXICODONE) 5 MG/5ML solution Take 5 mLs (5 mg) by mouth every 6 hours as needed for moderate to severe pain 3/31/2017 at Unknown time Yes Sukhjinder Barton PA-C   ondansetron (ZOFRAN-ODT) 4 MG ODT tab Take 1 tablet (4 mg) by mouth every 6 hours as needed for nausea Past Month at Unknown time Yes Kole Butcher PA-C   ranitidine (ZANTAC) 75 MG tablet Take 1 tablet (75 mg) by mouth 2 times daily 4/1/2017 at x1 Yes Kole Butcher PA-C   DULoxetine HCl (CYMBALTA PO) Take 60 mg by mouth daily 4/1/2017 at am Yes Reported, Patient   SUMATRIPTAN SUCCINATE PO Take 100 mg by mouth every 2 hours as needed for migraine Reported on 3/28/2017 4/1/2017 at am Yes Reported, Patient   GABAPENTIN PO Take 400 mg by mouth 3 times daily 4/1/2017 at x2 Yes Reported, Patient   LOSARTAN POTASSIUM PO Take 100 mg by mouth every evening 3/31/2017 at pm Yes Reported, Patient   METFORMIN HCL PO Take 1,000 mg by mouth 2 times daily (with meals) Reported on 3/28/2017 Past Month at Unknown time Yes Reported, Patient   BUSPIRONE HCL PO Take 10 mg by mouth 2 times daily 4/1/2017  "at x1 Yes Reported, Patient   CALCIUM PO Take 1 tablet by mouth 3 times daily 4/1/2017 at x1 Yes Reported, Patient   VITAMIN D, CHOLECALCIFEROL, PO Take 1 tablet by mouth daily 4/1/2017 at Unknown time Yes Reported, Patient   Acetaminophen (TYLENOL PO) Take 325-650 mg by mouth every 12 hours  Past Week at prn Yes Reported, Patient   multivitamin, therapeutic with minerals (MULTI-VITAMIN) TABS Take 2 tablets by mouth daily Herbalife 4/1/2017 at Unknown time Yes Reported, Patient   insulin syringe-needle U-100 30G X 5/16\" 1 ML Reported on 3/28/2017   Reported, Patient   ONE TOUCH ULTRA test strip    Reported, Patient   B-D U/F 31G X 8 MM insulin pen needle Reported on 3/28/2017   Reported, Patient   ONE TOUCH LANCETS MISC    Reported, Patient       "

## 2017-04-02 NOTE — CONSULTS
INTERNAL MEDICINE CONSULT    Consult note dictated:  #[id number]    Nicolás Acosta Jr., MD  [phone] (p)

## 2017-04-02 NOTE — PLAN OF CARE
Problem: Goal Outcome Summary  Goal: Goal Outcome Summary  Outcome: No Change  Patient A&O, VSS on RA, IV infusing, up SBA, voiding, NPO w/ ice chips. Patient c/o abdominal pain, 5/10, repositioned with relief. Patient c/o headache, sumatriptan given. LS clear, no N/V. Hgb q6hr, on admission 8.8, 0230 check Hgb 7.6. Will recheck in AM. Patient endorses some dizziness upon standing/ambulating. Hospitalist consult pending. Will continue to monitor.

## 2017-04-02 NOTE — PROGRESS NOTES
Case discussed with ED attending  Patient 9 days post RNY presented due to melanotic stools, dizziness and lightheadedness  Reportedly dark stools for 2 days  Some reported hematochezia  Ct normal  hgb 8.8 was 11.8 on 3/24    Will admit obs  hospitalist to manage DM  Monitor serial HGB  No indication to transfuse right now

## 2017-04-02 NOTE — ED PROVIDER NOTES
History     Chief Complaint:  Dizziness and Black Stools     HPI   Saundra Franks is a 31 year old female, 9 days status post Anamaria-en-Y Lap. Gastric Bypass Surgery, who presents with dizziness and black stools. She explains that she was discharged from the hospital 6 days ago. She has been eating a lot of soup, and some mashed potatoes today. She has had loose stools since leaving the hospital, slightly dark at the beginning, but progressively darker throughout the week.  She reports the onset of dizziness and lightheadedness upon standing today, and then had one episode of bright red blood in the toilet, which made her concerned.  She also has some mild low abdominal pain. She denies any fevers, chills, nausea, or vomiting.  She has minimal abdominal pain at this time.    Allergies:  Adhesive Tape, rash   Contrast Dye, itching and angioedema  Fentanyl Patch, rash      Medications:  (Pending med rec.)  oxyCODONE (ROXICODONE) 5 MG/5ML solution  ranitidine (ZANTAC) 75 MG tablet  DULoxetine HCl (CYMBALTA PO)  SUMATRIPTAN SUCCINATE PO  GABAPENTIN PO  LOSARTAN POTASSIUM PO  BUSPIRONE HCL PO  ondansetron (ZOFRAN-ODT) 4 MG ODT tab    Past Medical History:    Pancreatitis  Anxiety   Depression  GERD  Seizures  Type II Diabetes     Past Surgical History:    Cholecystectomy   Carpal tunnel release  Colonoscopy   Cystoscopy   Tonsillectomy   Tooth extraction   Laparoscopic gastric bypass  Hysterectomy   Lysis Adhesions   Tubal Ligation     Family History:    Father: Type II Diabetes  Mother: Psychotic Disorder, Thyroid Disease     Social History:  Tobacco: Former Smoker, QD 2007  Alcohol: Positive, occasionally   Marital Status:  Single [1]   Here with her boyfriend    Review of Systems   Constitutional: Negative for chills and fever.   Gastrointestinal: Positive for abdominal pain (low), blood in stool and diarrhea. Negative for nausea and vomiting.   Neurological: Positive for dizziness and light-headedness.   All other  "systems reviewed and are negative.      Physical Exam   First Vitals:  BP: 102/64  Heart Rate: 96  Temp: 98.4  F (36.9  C)  Resp: 16  Height: 162.6 cm (5' 4\")  Weight: 118.6 kg (261 lb 8 oz)  SpO2: 98 %      Physical Exam  General: Resting comfortably on the gurney, elevated BMI  Head:  The scalp, face, and head appear normal  Eyes:  The pupils are equal, round, and reactive to light    There is no nystagmus    Extraocular muscles are intact    Conjunctivae and sclerae are normal  ENT:    The nose is normal    Pinnae are normal    The oropharynx is normal    Uvula is in the midline  Neck:  Normal range of motion    There is no rigidity noted    There is no midline cervical spine pain/tenderness    Trachea is in the midline    No mass is detected  CV:  Regular rate and underlying rhythm     Normal S1/S2, no S3/S4    No pathological murmur detected  Resp:  Lungs are clear    There is no tachypnea    Non-labored    No rales    No wheezing   GI:  Abdomen is soft, there is no rigidity    Trochar incisions look excellent     No distension    No tympani    No rebound tenderness     Non-surgical without peritoneal features, overall benign exam  Rectal:  Loose black melena is noted. No hematochezia.   MS:  Normal muscular tone    Symmetric motor strength    No major joint effusions    No asymmetric leg swelling, no calf tenderness  Skin:  No rash or acute skin lesions noted  Neuro: Speech is normal and fluent  Psych:  Awake. Alert.      Normal affect.  Appropriate interactions.  Lymph: No anterior cervical lymphadenopathy noted      Emergency Department Course     Imaging:  Radiographic findings were communicated with the patient and family who voiced understanding of the findings.    CT Abdomen/Pelvis:  1. Status post gastric bypass surgery. No evidence of bowel  obstruction or diverticulitis. No evidence of colitis. Appendix not  visualized. Oral contrast reaches the colon.  2. Trace amounts of pleural fluid. Abdominal " organs are within normal  limits. No enlarged lymph nodes.   As per Radiology.     Laboratory:  CBC:  WBC 8.0, HGB 8.8 (L),   BMP: Glucose 175 (H), o/w WNL (Creat 0.66)    ABO/Rh: B+     Occult Stool: Positive     Interventions:  2102 Zofran 4mg IV   Protonix 40mg IV    NS 1 liter    Emergency Department Course:  Nursing notes and vitals reviewed.  I performed an exam of the patient as documented above.   Blood drawn. This was sent to the lab for further testing, results above.   A stool sample was collected and sent to the lab for further testing, results above.    The patient was sent for the following imaging studies while in the emergency department: CT Abdomen/Pelvis.     2147 I rechecked the patient.     2214 I consulted with Dr. Alfaro, general surgery.     2222 I rechecked the patinet.     2226 I consulted with Dr. Acosta, hospitalist.      Findings and plan explained to the Patient and significant other who consents to admission. Discussed the patient with Dr. Alfaro with Dr. Acosta consulting, who will admit the patient to an observation bed for further monitoring, evaluation, and treatment.     Impression & Plan      Medical Decision Making:  Saundra Franks is a 31 year old female status post Anamaria-en-Y laparoscopic gastric bypass surgery who has had ongoing low grade loose stools since discharge, that have brownish to dark in color. They have worsened over the last few days, and become more dark and also there was an episode of hematochezia described earlier. On exam, some loose black melena is noted. No hematochezia is noted. Hemoglobin has dropped 3 grams over the last 6 days. The most concerning etiology would be anastomotic bleeding, marginal ulceration, or occult peptic ulcer disease. The patient is hemodynamically stable. She does have hyperglycemia from known diabetes. Her CT scan is non-acute, and does not show evidence of obstruction or anatomic catastrophe such as internal hernia. No  perforation detected. The patient s CT is limited somewhat as IV contrast was not given, given her allergy. Dr. Alfaro from bariatric surgery will admit for observation for serial hemoglobins, IV hydration, proton pump inhibition. Dr. Acosta will consult from internal medicine.     Diagnosis:    ICD-10-CM    1. Gastrointestinal hemorrhage, unspecified gastrointestinal hemorrhage type K92.2    2. Gastrointestinal hemorrhage with melena K92.1    3. Abdominal pain, generalized R10.84    4. Anemia due to blood loss, acute D62    5. Hyperglycemia R73.9    6. Bariatric surgery status Z98.84        Disposition:  Placed on observation status for further management by Dr. Alfaro and Dr. Acosta.      I, Joyce Elmore, am serving as a scribe on 4/1/2017 at 8:44 PM to personally document services performed by Dr. Childress based on my observations and the provider's statements to me.     Joyce Elmore  4/1/2017    EMERGENCY DEPARTMENT       Ryder Childress MD  04/01/17 [id number]

## 2017-04-02 NOTE — PROVIDER NOTIFICATION
Brief update    Paged regarding anemia to 7.6.    Patient currently admitted to the surgery service for postoperative GI bleeding. Hospitalist service consulted for diabetes management. Patient is postoperative day #10 from laparoscopic Anamaria-en-Y and lysis of adhesions.    Patient asymptomatic, hemodynamically stable at this time.    Repeat hemoglobin time for 8 AM.    Abner Silvestre MD  3:02 AM    Paged re: headache.    Home sumatriptan reordered    Abner Silvestre MD  4:06 AM

## 2017-04-02 NOTE — PLAN OF CARE
Problem: Goal Outcome Summary  Goal: Goal Outcome Summary  Outcome: No Change  PRIOR TO DISCHARGE     Comments:      Stabilization of hgb - not met. q6hr Hgb's to begin at 0230.    Nurse to notify provider when observation goals have been met and patient is ready for discharge.

## 2017-04-02 NOTE — PROVIDER NOTIFICATION
Spoke with Dr. Silvestre regarding patients Hgb level of 7.6. Patient is non-symptomatic, currently sleeping soundly.  Dr. Silvestre stated to continue to monitor patient, would consider transfusing if Hgb 7.0 and patient symptomatic. Next Hgb check at 0830 this AM.  Will continue to monitor.

## 2017-04-02 NOTE — PROGRESS NOTES
[facility]  Hospitalist Progress Note      Assessment & Plan   Saundra Franks is a 31 year old female s/p lap Anamaria-en-Y gastric bypass 3/23/2017 with history of HTN, DM2 who was admitted on 4/1/2017 with black stools and dizziness.    Active Problems:    GI bleed  Hemoglobin 8.0 this AM, down from 11.8 on POD#1.  Fecal occult positive.  CT negative.  Protonix IV.  --Management as per Surgery.      Headache.  Tried imitrex without success.  Cannot give NSAIDs.  BG stable.  Well-hydrated with IVF.  Neurointact.  Try vicodin.\      Diabetes mellitus.    Monitor BG.  ISS.        Anxiety, depression.  Continue cymbalta, gabapentin, buspar.     DVT Prophylaxis: Defer to primary service  Code Status: Full Code    Disposition:  Per Surgery.     GÓMEZ Herr    Interval History   Tired.  Headache today improved with norco (imitrex didn't work).  Recovering from surgery still.  No fevers, SANCHEZ, chest pain.     -Data reviewed today: I reviewed all new labs and imaging results over the last 24 hours. I personally reviewed no images or EKG's today.    Physical Exam   Temp: 98.9  F (37.2  C) Temp src: Oral BP: 101/46 Pulse: 70 Heart Rate: 67 Resp: 14 SpO2: 99 % O2 Device: None (Room air)    Vitals:    04/01/17 2010 04/01/17 2317   Weight: 118.6 kg (261 lb 8 oz) 121.1 kg (267 lb)     Vital Signs with Ranges  Temp:  [98.4  F (36.9  C)-98.9  F (37.2  C)] 98.9  F (37.2  C)  Pulse:  [69-85] 70  Heart Rate:  [67-96] 67  Resp:  [14-20] 14  BP: ()/(46-72) 101/46  SpO2:  [93 %-100 %] 99 %  I/O last 3 completed shifts:  In: -   Out: 300 [Urine:300]    General Appearance:  Pleasant, cooperative, alert. Well developed, well nourished.  HEENT: Normocephalic, atraumatic.  Extra occular mm intact.  Sclera clear. PERRL.   Lungs: Clear to auscultation bilaterally   Heart: Regular rate and rhythm.  No appreciated murmur.    Abdomen: Soft, tender  Musculoskeletal:  Moving x 4 spontaneously with CMS intact x4.     Neuro: Alert and oriented x3.  CN II-XII grossly intact and symmetric.     Medications     lactated ringers 100 mL/hr at 04/01/17 2341       sodium chloride (PF)  3 mL Intracatheter Q8H     pantoprazole  40 mg Intravenous QAM AC     busPIRone (BUSPAR) tablet 10 mg  10 mg Oral BID     DULoxetine (CYMBALTA) EC capsule 60 mg  60 mg Oral Daily     gabapentin (NEURONTIN) capsule 400 mg  400 mg Oral TID     multivitamin, therapeutic with minerals  2 tablet Oral Daily     ranitidine  75 mg Oral BID     cholecalciferol  1,000 Units Oral Daily     insulin aspart  1-3 Units Subcutaneous TID AC     insulin aspart  1-3 Units Subcutaneous At Bedtime     Data     Recent Labs  Lab 04/02/17  0750 04/02/17  0235 04/01/17  2030   WBC  --   --  8.0   HGB 8.0* 7.6* 8.8*   MCV  --   --  87   PLT  --   --  317   NA  --   --  142   POTASSIUM  --   --  3.7   CHLORIDE  --   --  105   CO2  --   --  26   BUN  --   --  14   CR  --   --  0.66   ANIONGAP  --   --  11   MATTHEW  --   --  8.7   GLC  --   --  175*       Recent Results (from the past 24 hour(s))   CT Abdomen pelvis - oral contrast only    Narrative    CT ABDOMEN PELVIS WITHOUT CONTRAST 4/1/2017 9:43 PM     HISTORY: Recent lap Anamaria-en-Y gastric bypass.  Bleeding, rectal.     TECHNIQUE: Axial images are obtained from the lung bases to the  symphysis without IV contrast. Oral contrast is administered. Coronal  reformatted images are also generated.  Radiation dose for this scan  was reduced using automated exposure control, adjustment of the mA  and/or kV according to patient size, or iterative reconstruction  technique.    FINDINGS:     Trace left pleural effusion is present with mild bibasilar  atelectasis. A tiny amount of right pleural fluid is likely.    Abdomen: Patient is status post gastric bypass surgery. No evidence of  bowel obstruction. Anastomoses appear patent. Appendix is not  visualized. No enlarged lymph nodes. The upper abdominal organs  including the liver, spleen,  pancreas, adrenal glands and kidneys are  within normal limits allowing for the noncontrast technique. Prior  cholecystectomy.    Pelvis: The bladder and rectum are unremarkable. No adnexal mass. No  enlarged pelvic lymph nodes. Small amount of free pelvic fluid is  noted. Bone window examination is unremarkable.      Impression    IMPRESSION:  1. Status post gastric bypass surgery. No evidence of bowel  obstruction or diverticulitis. No evidence of colitis. Appendix not  visualized. Oral contrast reaches the colon.  2. Trace amounts of pleural fluid. Abdominal organs are within normal  limits. No enlarged lymph nodes.      VLADIMIR ALEX MD

## 2017-04-02 NOTE — PLAN OF CARE
Problem: Goal Outcome Summary  Goal: Goal Outcome Summary  Outcome: No Change  PRIOR TO DISCHARGE     Comments:      Stabilization of hgb - not met. Hgb on admission was 8.8, 0230 Hgb 7.6    Nurse to notify provider when observation goals have been met and patient is ready for discharge.

## 2017-04-02 NOTE — ED NOTES
[facility]  ED Nurse Handoff Report    ED Chief complaint: Dizziness (had gastric bypass 3/23 and having dizziness and blood in stool) and Rectal Bleeding      ED Diagnosis:   Final diagnoses:   Gastrointestinal hemorrhage, unspecified gastrointestinal hemorrhage type   Gastrointestinal hemorrhage with melena   Abdominal pain, generalized   Anemia due to blood loss, acute   Hyperglycemia   Bariatric surgery status       Code Status: Full Code    Allergies:   Allergies   Allergen Reactions     Shellfish-Derived Products Itching     Adhesive Tape Rash     Contrast Dye Rash     Itching, angioedema     Fentanyl Rash     Only with fentanyl patch       Activity level - Baseline/Home:  Independent    Activity Level - Current:   Independent     Needed?: No    Isolation: No  Infection: Not Applicable    Bariatric?: No    Vital Signs:   Vitals:    04/01/17 2151 04/01/17 2200 04/01/17 2215 04/01/17 2230   BP: 96/58 97/49 108/72 109/59   Pulse: 77 69 73 72   Resp:  20     Temp:       TempSrc:       SpO2: 100% 94% 100% 100%   Weight:       Height:           Cardiac Rhythm: ,        Pain level: 0-10 Pain Scale: 4    Is this patient confused?: No    Patient Report: Initial Complaint: Dizziness and rectal bleeding  Focused Assessment: Pt had gastric bypass surgery on 3/23/17. Began noting blood in stools last night and stated she also had some RLQ pain that began then. Today pt noted she was dizzy at times. Pt reports having mainly liquid stools since surgery and has progressed to a full liquid diet. States recovery has gone well up until this point. Color pale. Pt feels she is breathing more shallow today than usual but no SOB and respirations appear WNL.   Tests Performed: Labs, Occult stool, Abd/pelvis CT  Abnormal Results: Hgb 8.8, Glucose 175, occult blood positive  Treatments provided: 1L NS bolus and 2nd to be started, monitoring, Zofran 4mg IV    Family Comments: significant other at  bedside    OBS brochure/video discussed/provided to patient: Yes    ED Medications:   Medications   0.9% sodium chloride BOLUS (not administered)   pantoprazole (PROTONIX) 40 mg IV push injection (not administered)   naloxone (NARCAN) injection 0.1-0.4 mg (not administered)   lidocaine 1 % 1 mL (not administered)   lidocaine (LMX4) cream (not administered)   sodium chloride (PF) 0.9% PF flush 3 mL (not administered)   sodium chloride (PF) 0.9% PF flush 3 mL (not administered)   lactated ringers infusion (not administered)   HYDROmorphone (PF) (DILAUDID) injection 0.2 mg (not administered)   ondansetron (ZOFRAN-ODT) ODT tab 4 mg (not administered)     Or   ondansetron (ZOFRAN) injection 4 mg (not administered)   pantoprazole (PROTONIX) 40 mg IV push injection (not administered)   0.9% sodium chloride BOLUS (1,000 mLs Intravenous New Bag 4/1/17 2102)   ondansetron (ZOFRAN) injection 4 mg (4 mg Intravenous Given 4/1/17 2102)   iohexol (OMNIPAQUE) solution 25 mL (25 mLs Oral Given 4/1/17 2131)       Drips infusing?:  Yes      ED NURSE PHONE NUMBER: [phone]

## 2017-04-02 NOTE — PROGRESS NOTES
Outcome: No Change  PRIOR TO DISCHARGE    Comments:       Stabilization of hgb - continuing to monitor. 8.8, 7.6, 8.0     No BM overnight.

## 2017-04-02 NOTE — PROGRESS NOTES
Outcome: No Change  PRIOR TO DISCHARGE    Comments:       Stabilization of hgb - continuing to monitor. 8.8, 7.6, 8.0.      Pt. A&O. Indp. Dizziness resolved. Tolerating clear liquid diet. Bowel sounds active. No BM or s/sx of active bleeding during shift. Incisions dry and intact with some remaining steri strips. Repeat Hgb scheduled for 17:00.

## 2017-04-02 NOTE — PROGRESS NOTES
Appreciate hospitalist help. Some dark blood MN post weight loss operation. Hgb now stable, no active bleeding currently.  Wounds OK, abd soft, pt. Comfortable.   Plan: try clears. One more recheck Hgb. Perhaps home tonight if stable.

## 2017-04-02 NOTE — CONSULTS
INTERNAL MEDICINE CONSULTATION      DATE OF ADMISSION:  04/01/2017.      PRIMARY CARE PHYSICIAN:  Paulo Martin MD.      REQUESTING PHYSICIAN:  Bladimir Alfaro MD.        REASON FOR CONSULTATION:  I was asked to see Ms Franks to assist with management of chronic medical problems.      HISTORY OF PRESENT ILLNESS:  Saundra Franks is a 31-year-old female patient with history noted below, including diabetes mellitus type 2, hypertension, depression, anxiety, GERD and morbid obesity who recently underwent laparoscopic gastric bypass with Anamaria-en-Y gastrojejunostomy along with lysis of adhesions on 03/23/2017.  The patient was discharged from this hospital on 03/26/2017.  The patient had been an insulin-dependent diabetic and also was on metformin but since the surgery she has not needed any insulin.  She was discharged and blood glucose levels have been mostly in the 120s to 130s and she has not needed any insulin or metformin whatsoever.  However, after going home, she noted intermittent black stools and blood per rectum.  She has become progressively more dizzy.  Today, she was quite dizzy when she stood up.  Given her some symptomatology, she came to [facility] for further evaluation.      The patient seen in the ER by Dr. Childress.  Vitals showed a temperature 98.4 degrees, heart rate 96, blood pressure 102/64, respiratory rate 16, O2 saturations 98% on room air.  She had laboratory evaluation which showed that her hemoglobin was 8.8 where it had been 11.8 on 03/24.  BMP was unremarkable.  Stool occult blood testing was positive.  She did undergo CT imaging of her abdomen and pelvis without contrast which showed the following:  Status post gastric bypass surgery without evidence of bowel obstruction or diverticulitis and no evidence of colitis, appendix not visualized and oral contrast reached the colon; trace amount of pleural fluid with abdominal organs within normal limits and no enlarged lymph nodes.  Given her  issues, Surgery was contacted and she is being admitted under observation and given her other medical issues, request for Internal Medicine consultation was made and I was contacted.      The patient denies any chest pain.  No shortness breath.  No fevers or chills.  She sas felt nauseated.  He has been tolerating a full liquid diet.      PAST MEDICAL HISTORY:   1.  Diabetes mellitus white type 2.  Hemoglobin A1c was 7.3 on 03/24/2017, previously required insulin but since the gastric bypass surgery her insulin requirements decreased dramatically and she has not been needing any of her previous insulin or metformin since discharge.   2.  Hypertension.   3.  Depression/anxiety.   4.  Gastroesophageal reflux disease.   5.  Morbid obesity:  Status post laparoscopic gastric bypass with Anamaria-en-Y gastrojejunostomy and lysis of adhesions on 03/23/2017.   6.  Migraine history.   7.  Pancreatitis history.      PAST SURGICAL HISTORY:   1.  Status post tonsillectomy and adenoidectomy.   2.  Status post cholecystectomy.   3.  Status post bilateral carpal tunnel release.     4.  Status post cystoscopy.   5.  Status post tooth extraction.   6.  Status post laparoscopic hysterectomy for chronic pelvic pain in 2015.   7.  Status post tubal ligation 2015.   8.  Status post laparoscopic gastric bypass with Anamarai-en-Y gastrojejunostomy with lysis of adhesions 03/23/2017.      ALLERGIES:  Shellfish, adhesive tape, contrast dye, fentanyl.      PRIOR TO ADMISSION MEDICATIONS:   1.  Acetaminophen 325-650 mg q.4 h.   2.  Buspirone 10 mg b.i.d.   3.  Calcium 1 tablet 3 times a day.   4.  Duloxetine 60 mg every day.   5.  Gabapentin 400 mg 3 times a day.   6.  Losartan 100 mg every evening.   7.  Multivitamin with minerals 2 tabs daily.   8.  Zofran ODT 4 mg every 6 hours p.r.n.   9.  Oxycodone 5 mg per 5 mL every 6 hours p.r.n.   10.  Zantac 75 mg b.i.d.   11.  Sumatriptan p.r.n.   12.  Vitamin D daily.   13.  Although NovoLog mix and  metformin are currently on the prior to admission medication list, patient says that she has not needed and has not taken these medications since discharge.      SOCIAL HISTORY:  The patient does not smoke or drink.  She is not .  She does not have any children.  She works as a  at [employer].      FAMILY HISTORY:  Her biological father  in his 60s of heart disease with but unclear when he first had heart problems.  No history of colon cancer noted.      PHYSICAL EXAMINATION:   VITAL SIGNS:  Temperature 98.4 degrees, heart rate 70, blood pressure 101/65, respiratory rate 20, O2 saturations 100% on room air.   GENERAL:  This is a 31-year-old female patient sitting in bed.  She is conversant and friendly.   HEENT:  Pupils equal, round, reactive.  No scleral icterus or conjunctival injection.  Oropharynx reveals no gross erythema or exudate.   NECK:  No bruits, JVD or adenopathy.   HEART:  Regular rate and rhythm without significant murmurs, rubs, gallops.   LUNGS:  Grossly clear, without crackles or wheezes.   ABDOMEN:  Soft.  Binder in place.  No rebound or guarding tenderness.  There are a few bowel sounds.  No obvious femoral bruits.   EXTREMITIES:  Show trace edema, without knee joint swelling or skin rash.   NEUROLOGIC:  No gross focal motor or sensory deficits.      LABORATORY DATA:  Sodium 142, potassium 3.7, chloride 105, bicarbonate 26, BUN 14, creatinine 0.66, calcium 8.7.  Occult blood positive.  Glucose 175.  CBC showed white count 8.0, hemoglobin 8.8, platelets 317.        Imaging as above.      ASSESSMENT AND PLAN:  This is a 31-year-old female patient with history including diabetes mellitus type 2, hypertension, depression/anxiety, gastroesophageal reflux disease and morbid obesity who recently underwent laparoscopic gastric bypass surgery (2017) who presents with black/bloody stools and dizziness and found to have a hemoglobin of 8.8.    1.  Acute anemia, suspect from  "blood loss:  This is in the setting of recent laparoscopic gastric bypass surgery on 2017.  The patient will be admitted to the Surgery Service and plan is for IV Protonix and monitoring hemoglobin and consideration of endoscopy evaluation.  Overall plan per Surgery.   2.  Diabetes mellitus type 2:  Hemoglobin A1c was 7.3  on 2017.  Previously was on a regimen of NovoLog mix 70/30 and metformin, but after the surgery had a dramatic decrease in insulin requirements and since discharge has not needed any insulin or oral diabetic medications.  She says that when she has been checking at home, they have been in the 120s and 130s.  Here glucose level was 173.  This could be stress related.  At this time, will order insulin aspart low resistance correction scale.  The patient is on gabapentin, presumably for neuropathy and will continue this.   3.  Benign essential hypertension:  Prior to admission regimen consists of Losartan 100 mg daily.  Given her dizziness and \"soft\" blood pressures will hold losartan for now.  Monitor blood pressures.   4.  Depression/anxiety:  Continue buspirone and duloxetine.   5.  Gastroesophageal reflux disease:  Continue Zantac.  The patient will be started on IV Protonix as above for surgery.   6.  History of migraines:  Can order sumatriptan as needed.   7.  Prophylaxis:  Pneumoboots and ambulation and otherwise per Surgery.      CODE STATUS:  Full code.      Thank you for asking us to participate in the care of this very pleasant patient.         SHELDON COULTER JR., MD             D: 2017 23:07   T: 2017 00:49   MT:       Name:     YOSEPH CRISTOBAL   MRN:      [id number]        Account:       [id number]   :      1985           Consult Date:  2017      Document: [id number]       cc: Bladimir Martin MD     "

## 2017-04-03 ENCOUNTER — MYC MEDICAL ADVICE (OUTPATIENT)
Dept: SURGERY | Facility: CLINIC | Age: 32
End: 2017-04-03

## 2017-04-03 DIAGNOSIS — K91.2 POSTOPERATIVE MALABSORPTION: ICD-10-CM

## 2017-04-03 DIAGNOSIS — E66.01 MORBID OBESITY, UNSPECIFIED OBESITY TYPE (H): Primary | ICD-10-CM

## 2017-04-03 DIAGNOSIS — Z98.84 BARIATRIC SURGERY STATUS: ICD-10-CM

## 2017-04-03 RX ORDER — CYANOCOBALAMIN 1000 UG/ML
1 INJECTION, SOLUTION INTRAMUSCULAR; SUBCUTANEOUS
Qty: 3 ML | Refills: 3 | Status: SHIPPED | OUTPATIENT
Start: 2017-04-03

## 2017-04-06 ENCOUNTER — OFFICE VISIT (OUTPATIENT)
Dept: SURGERY | Facility: CLINIC | Age: 32
End: 2017-04-06
Payer: COMMERCIAL

## 2017-04-06 ENCOUNTER — HOSPITAL ENCOUNTER (OUTPATIENT)
Dept: LAB | Facility: CLINIC | Age: 32
Discharge: HOME | End: 2017-04-06
Attending: PHYSICIAN ASSISTANT | Admitting: PHYSICIAN ASSISTANT
Payer: COMMERCIAL

## 2017-04-06 VITALS
OXYGEN SATURATION: 100 % | TEMPERATURE: 97.9 F | WEIGHT: 251.8 LBS | DIASTOLIC BLOOD PRESSURE: 61 MMHG | RESPIRATION RATE: 16 BRPM | HEART RATE: 81 BPM | SYSTOLIC BLOOD PRESSURE: 108 MMHG | BODY MASS INDEX: 43.22 KG/M2

## 2017-04-06 DIAGNOSIS — Z98.84 BARIATRIC SURGERY STATUS: ICD-10-CM

## 2017-04-06 DIAGNOSIS — D50.0 ANEMIA DUE TO BLOOD LOSS: ICD-10-CM

## 2017-04-06 DIAGNOSIS — K91.2 POSTOPERATIVE MALABSORPTION: Primary | ICD-10-CM

## 2017-04-06 DIAGNOSIS — E66.01 MORBID OBESITY (H): ICD-10-CM

## 2017-04-06 DIAGNOSIS — K91.2 POSTOPERATIVE MALABSORPTION: ICD-10-CM

## 2017-04-06 LAB — HGB BLD-MCNC: 9.1 G/DL (ref 11.7–15.7)

## 2017-04-06 PROCEDURE — 36415 COLL VENOUS BLD VENIPUNCTURE: CPT | Performed by: PHYSICIAN ASSISTANT

## 2017-04-06 PROCEDURE — 99207 ZZC NO CHARGE NURSE ONLY: CPT

## 2017-04-06 PROCEDURE — 85018 HEMOGLOBIN: CPT | Performed by: PHYSICIAN ASSISTANT

## 2017-04-06 PROCEDURE — 97803 MED NUTRITION INDIV SUBSEQ: CPT | Performed by: DIETITIAN, REGISTERED

## 2017-04-06 NOTE — PROGRESS NOTES
[facility]  2 Week Surgical Follow-Up     Current PCP:  Paulo Martin  Surgeon: PEPPER (P)  HISTORY OF PRESENT ILLNESS:  Saundra Franks returns today for her follow-up appointment status post Surgery Type: (P) Bypass DOS: (P) 03/23/17.  She is accompanied by self. Her daily fluid intake in oz is: (P) 40 oz. water and Crystal Light, consisting of Water and Crystal Light.  Feels well emotionally Yes.   Patient reports using: Alcohol - No     Cigarettes - No  Pain:    She is currently using Roxicodone 5 ml seldom. She rates her pain at a Pain Scale: (P) 2 on the pain scale. The pain is located lower abdomen on right and left sides.  Refill Needed: (P) No  Activity:  The patient is considered a fall risk: No. Interventions to secure the patient s safety are in place.  What are you doing for physical activity? Activity: (P) Trying to walk more  How many days per week?  7 days  She plans to go to the gym at about 4-6 weeks PO.    Review of Systems:   GI:  Nausea: (P) Yes occurs rarely.    Vomiting: (P) No occurs never.   GERD: (P) Yes only when eats too fast/much  Diarrhea: (P) No occurs  never   Patient's Last BM: (P) this am BM's are loose to formed and normal brown color 2-3 times daily.       Neuro/CV/Pulmonary:   Dizzy: (P) Yes occurs infrequently.  Light Headed: (P) Yes occurs infrequently. These symptoms may be due in part to both a bit dry and recent blood loss - will request for Hgb check.   SOB: (P) No occurs never.  Chest Pain: (P) No occurs never.  Vascular:    LE Edema: (P) No  Areli's Negative  :  Form of birth control is Contraception: (P) hyst  Symptoms of UTI:  Dysuria: (P) No  Endo: Diabetes: (P) Yes  BS check (freq): (P) 2 times daily                  Avg. BS Level: (P) 130-140  PHYSICAL EXAMINATION:    VITALS:  /61 (BP Location: Left arm, Patient Position: Chair, Cuff Size: Adult Large)  Pulse 81  Temp 97.9  F (36.6  C)  Resp 16  Wt 251 lb 12.8 oz (114.2 kg)  LMP  (LMP  Unknown)  SpO2 100%  Breastfeeding? No  BMI 43.22 kg/m2                  Cuff Size: large Left  LUNGS:  clear to auscultation  ABDOMEN: Abdomen soft, non-tender. BS normal. No masses, organomegaly  INCISION: dry and intact    MEDICATIONS/VITAMINS/ALLERGIES REVIEWED: No  ASA Hx: (P) No  Actigall Prescription: No     ASSESSMENT:    1.  2 weeks surgery.    INTERVENTIONS:      Symptoms given re: signs and symptoms of low BP and when to call PCP. Patient verbalized understanding.   Steri strips removed.     PLAN:    Make follow up appointment to meet with psychologist and 1 month post operatively.   Follow up with your primary care provider to obesity related conditions by one month post op - plans to do so next week.   Advance diet per Dietitian at your 2 week appointment.   Strive to drink 64 oz of fluid daily.  Call with questions or concerns at any time.  Patient to call tomorrow morning if feels would like fluids.  Patient's Hgb drawn per VOR[facility] of JOSELUIS Hein - noted results 9.1 up from 7.6 at discharge on 4/2/17.  Patient informed of results.

## 2017-04-06 NOTE — MR AVS SNAPSHOT
After Visit Summary   4/6/2017    Saundra Franks    MRN: [id number]           Patient Information     Date Of Birth          1985        Visit Information        Provider Department      4/6/2017 11:00 AM Rn, Jeyson Padgett, RN [facility]      Today's Diagnoses     Postoperative malabsorption    -  1    Morbid obesity (H)        Bariatric surgery status        Anemia due to blood loss           Follow-ups after your visit        Your next 10 appointments already scheduled     Apr 19, 2017  9:00 AM CDT   Post Op with Jeyson Padgett Diet 1, RD   [facility] ([facility])    [facility] [place] [id number] [phone]              Future tests that were ordered for you today     Open Future Orders        Priority Expected Expires Ordered    Hemoglobin Routine  10/3/2017 4/6/2017            Who to contact     If you have questions or need follow up information about today's clinic visit or your schedule please contact [facility] directly at [phone].  Normal or non-critical lab and imaging results will be communicated to you by Motif BioScienceshart, letter or phone within 4 business days after the clinic has received the results. If you do not hear from us within 7 days, please contact the clinic through [email or web address] or phone. If you have a critical or abnormal lab result, we will notify you by phone as soon as possible.  Submit refill requests through [email or web address] or call your pharmacy and they will forward the refill request to us. Please allow 3 business days for your refill to be completed.          Additional Information About Your Visit        Motif BioScienceshart Information     [email or web address] gives you secure access to your electronic health record. If you see a primary care provider, you can also send messages to your care team and make appointments.  If you have questions, please call your primary care clinic.  If you do not have a primary care provider, please call [phone] and they will assist you.        Care EveryWhere ID     This is your Care EveryWhere ID. This could be used by other organizations to access your [facility] [place] medical records  [id number]        Your Vitals Were     Pulse Temperature Respirations Last Period Pulse Oximetry Breastfeeding?    81 97.9  F (36.6  C) 16 (LMP Unknown) 100% No    BMI (Body Mass Index)                   43.22 kg/m2            Blood Pressure from Last 3 Encounters:   04/06/17 108/61   04/02/17 109/57   03/28/17 134/80    Weight from Last 3 Encounters:   04/06/17 251 lb 12.8 oz (114.2 kg)   04/01/17 267 lb (121.1 kg)   03/28/17 272 lb 12.8 oz (123.7 kg)               Primary Care Provider Office Phone # Fax #    Paulo Martin [phone]       [place] [address]        Thank you!     Thank you for choosing [facility]  for your care. Our goal is always to provide you with excellent care. Hearing back from our patients is one way we can continue to improve our services. Please take a few minutes to complete the written survey that you may receive in the mail after your visit with us. Thank you!             Your Updated Medication List - Protect others around you: Learn how to safely use, store and throw away your medicines at www.disposemymeds.org.          This list is accurate as of: 4/6/17  2:10 PM.  Always use your most recent med list.                   Brand Name Dispense Instructions for use    B-D U/F 31G X 8 MM   Generic drug:  insulin pen needle      Reported on 3/28/2017       BUSPIRONE HCL PO      Take 10 mg by mouth 2 times daily       CALCIUM PO      Take 1 tablet by mouth 3 times daily       cyanocobalamin 1000 MCG/ML injection    VITAMIN B12    3 mL    Inject 1 mL (1,000 mcg) Subcutaneous every 30 days     "   CYMBALTA PO      Take 60 mg by mouth daily       GABAPENTIN PO      Take 400 mg by mouth 3 times daily       insulin syringe-needle U-100 30G X 5/16\" 1 ML      Reported on 3/28/2017       LOSARTAN POTASSIUM PO      Take 100 mg by mouth every evening       METFORMIN HCL PO      Take 1,000 mg by mouth 2 times daily (with meals) Reported on 4/6/2017       MULTIVITAMIN ADULT PO      Take 1 tablet by mouth 2 times daily Woman's Alive MVI       NovoLOG MIX 70/30 FLEXPEN injection   Generic drug:  insulin aspart prot & aspart      Inject 60 Units Subcutaneous 2 times daily (before meals) Taking 1 unit PRN       ondansetron 4 MG ODT tab    ZOFRAN-ODT    20 tablet    Take 1 tablet (4 mg) by mouth every 6 hours as needed for nausea       ONE TOUCH LANCETS Misc          ONE TOUCH ULTRA test strip   Generic drug:  blood glucose monitoring          oxyCODONE 5 MG/5ML solution    ROXICODONE    150 mL    Take 5 mLs (5 mg) by mouth every 6 hours as needed for moderate to severe pain       ranitidine 75 MG tablet    ZANTAC    60 tablet    Take 1 tablet (75 mg) by mouth 2 times daily       SUMATRIPTAN SUCCINATE PO      Take 100 mg by mouth every 2 hours as needed for migraine Reported on 3/28/2017       syringe/needle (disp) 25G X 1\" 3 ML Misc     3 each    Use for B-12 injection       TYLENOL PO      Take 325-650 mg by mouth every 12 hours       VITAMIN D (CHOLECALCIFEROL) PO      Take 1 tablet by mouth daily Cap is a 6000 international unit cap         "

## 2017-04-06 NOTE — PROGRESS NOTES
BARIATRIC PROGRESS NOTE - 2 WEEK NUTRITION FOLLOW UP  DATE OF VISIT: 2017  Name: YOSEPH CRISTOBAL  : 1985  Gender: Female  MRN: [id number]  Age: 31  ASSESSMENT:  REASON FOR VISIT:  YOSEPH CRISTOBAL is a 31 year old Female presents today for a 2 week post op nutrition follow-up appointment.  DIAGNOSIS:  Status post Laparoscopic Anamaria-en-Y Gastric Bypass surgery.  ANTHROPOMETRICS:  Height: 64.5 inches  Initial Weight: 287.1 lbs  Weight: 251.8 lbs  BMI: 42.5 kg/m2    VITAMINS AND MINERALS:  Multivitamin - 2 doses, daily  Calcium - 500mg TID  Vitamin D - 6000 international units, daily  Vitamin B12 - monthly injections  Per RN, will start Vitron C  NUTRITION HISTORY:  Tolerating diet: Bariatric Full Liquids  Fluids/water intake: 40 ounces/day  Milk intake: 0 ounces/day  Small bites:Yes  1/2 cup meals: Yes  20-30 minute meals: Yes  Breakfast: scrambled eggs  Lunch: protein drink  Dinner: mashed potatoes or soup   Snacks: No  Nausea: none  Vomiting: none  PHYSICAL ACTIVITY:  Type: Walking  Frequency: 5-6 times a week  Duration (min): 30  NUTRITION DIAGNOSIS: Altered gastrointestinal function related to alternation in gastrointestinal structure as evidenced by history of Laparoscopic Anamaria-en-Y Gastric Bypass.  IMPLEMENTATION:    Nutrition Prescription: Recommended bariatric pureed diet.  Goals:  Start puree diet at day 14 post op.  Increase to solids at day 28 post op.  Continue MVI, Calcium with vitamin D, B12, vitamin D. Start Iron and vitamin C.  Aim for 60 to 70 grams protein.  Increase 48 to 64 oz of fluid per day.  Implementation:  Discussed transition to puree diet.  Emphasized importance of adequate protein.  Evaluated and reviewed required vitamins and mineral supplements.  Verbalizes understanding of surgery diet guidelines.  NUTRITION MONITORING AND EVALUATION:  Monitor diet tolerance and weight loss.  4 weeks post op  Antcipated Compliance: Fair-good  Follow Up: Continue to monitor pt closely  regarding wt loss and diet  TIME SPENT WITH PATIENT: 15 minutes.  Letha Diaz RD, LD  Clinical Dietitian

## 2017-04-06 NOTE — MR AVS SNAPSHOT
MRN:[id number]                      After Visit Summary   4/6/2017    Saundra Franks    MRN: [id number]           Visit Information        Provider Department      4/6/2017 11:30 AM 3, Jeyson Ford RD [facility]      Your next 10 appointments already scheduled     Apr 19, 2017  9:00 AM CDT   Post Op with Jeyson Ford 1, RD   [facility] ([facility])    [address] [place] [id number] [phone]              MyChart Information     Revee gives you secure access to your electronic health record. If you see a primary care provider, you can also send messages to your care team and make appointments. If you have questions, please call your primary care clinic.  If you do not have a primary care provider, please call [phone] and they will assist you.        Care EveryWhere ID     This is your Care EveryWhere ID. This could be used by other organizations to access your [place] medical records  [id number]

## 2017-04-07 ENCOUNTER — TELEPHONE (OUTPATIENT)
Dept: SURGERY | Facility: CLINIC | Age: 32
End: 2017-04-07

## 2017-04-11 ENCOUNTER — TELEPHONE (OUTPATIENT)
Dept: SURGERY | Facility: CLINIC | Age: 32
End: 2017-04-11

## 2017-04-11 DIAGNOSIS — E86.0 DEHYDRATION: Primary | ICD-10-CM

## 2017-04-11 NOTE — TELEPHONE ENCOUNTER
Patient called in stating she would like to receive IVFs because she has only been getting in about 30 oz of fluid for the past 3 days.  Before that she was getting in 40-50 oz.  Patient denies any abdominal pain - does have some side cramps. No vomiting and mild on and off nausea.  Says she isn't drinking more because she feels full.  Has been eating roughly 1/2 cup of pureed food 3 times daily.  Sounded tired on the phone.  When asked if she was tired patient said yes due to her meds.  Not her vitamins.  Advised her will set her up for fluids.  She prefers [facility].  Patient denies any bloody or black stools.    Called patient back and let her know that she has been set up for IVF both tomorrow and the following day.  Gaver her directions.  She verbalized understanding.

## 2017-04-12 ENCOUNTER — INFUSION THERAPY VISIT (OUTPATIENT)
Dept: INFUSION THERAPY | Facility: CLINIC | Age: 32
End: 2017-04-12
Attending: SURGERY
Payer: COMMERCIAL

## 2017-04-12 ENCOUNTER — HOSPITAL ENCOUNTER (OUTPATIENT)
Facility: CLINIC | Age: 32
Setting detail: SPECIMEN
Discharge: HOME | End: 2017-04-12
Attending: PHYSICIAN ASSISTANT | Admitting: PHYSICIAN ASSISTANT
Payer: COMMERCIAL

## 2017-04-12 VITALS
DIASTOLIC BLOOD PRESSURE: 72 MMHG | SYSTOLIC BLOOD PRESSURE: 121 MMHG | TEMPERATURE: 98.2 F | HEART RATE: 68 BPM | RESPIRATION RATE: 14 BRPM

## 2017-04-12 DIAGNOSIS — E86.0 DEHYDRATION: Primary | ICD-10-CM

## 2017-04-12 DIAGNOSIS — Z98.84 BARIATRIC SURGERY STATUS: ICD-10-CM

## 2017-04-12 LAB
ANION GAP SERPL CALCULATED.3IONS-SCNC: 8 MMOL/L (ref 3–14)
CHLORIDE SERPL-SCNC: 108 MMOL/L (ref 94–109)
CO2 SERPL-SCNC: 27 MMOL/L (ref 20–32)
HGB BLD-MCNC: 9.1 G/DL (ref 11.7–15.7)
POTASSIUM SERPL-SCNC: 3.7 MMOL/L (ref 3.4–5.3)
SODIUM SERPL-SCNC: 143 MMOL/L (ref 133–144)

## 2017-04-12 PROCEDURE — 25000128 H RX IP 250 OP 636: Performed by: PHYSICIAN ASSISTANT

## 2017-04-12 PROCEDURE — 80051 ELECTROLYTE PANEL: CPT | Performed by: PHYSICIAN ASSISTANT

## 2017-04-12 PROCEDURE — 25000125 ZZHC RX 250: Performed by: PHYSICIAN ASSISTANT

## 2017-04-12 PROCEDURE — 85018 HEMOGLOBIN: CPT | Performed by: PHYSICIAN ASSISTANT

## 2017-04-12 PROCEDURE — 36415 COLL VENOUS BLD VENIPUNCTURE: CPT

## 2017-04-12 PROCEDURE — 96375 TX/PRO/DX INJ NEW DRUG ADDON: CPT

## 2017-04-12 PROCEDURE — 25800025 ZZH RX 258: Performed by: PHYSICIAN ASSISTANT

## 2017-04-12 PROCEDURE — 96365 THER/PROPH/DIAG IV INF INIT: CPT

## 2017-04-12 PROCEDURE — 96361 HYDRATE IV INFUSION ADD-ON: CPT

## 2017-04-12 PROCEDURE — 96367 TX/PROPH/DG ADDL SEQ IV INF: CPT

## 2017-04-12 RX ADMIN — ASCORBIC ACID, VITAMIN A PALMITATE, CHOLECALCIFEROL, THIAMINE HYDROCHLORIDE, RIBOFLAVIN-5 PHOSPHATE SODIUM, PYRIDOXINE HYDROCHLORIDE, NIACINAMIDE, DEXPANTHENOL, ALPHA-TOCOPHEROL ACETATE, VITAMIN K1, FOLIC ACID, BIOTIN, CYANOCOBALAMIN: 200; 3300; 200; 6; 3.6; 6; 40; 15; 10; 150; 600; 60; 5 INJECTION, SOLUTION INTRAVENOUS at 10:53

## 2017-04-12 RX ADMIN — ONDANSETRON HYDROCHLORIDE 4 MG: 2 INJECTION, SOLUTION INTRAVENOUS at 09:38

## 2017-04-12 RX ADMIN — PANTOPRAZOLE SODIUM 40 MG: 40 INJECTION, POWDER, FOR SOLUTION INTRAVENOUS at 09:34

## 2017-04-12 RX ADMIN — SODIUM CHLORIDE, POTASSIUM CHLORIDE, SODIUM LACTATE AND CALCIUM CHLORIDE 1000 ML: 600; 310; 30; 20 INJECTION, SOLUTION INTRAVENOUS at 09:33

## 2017-04-12 ASSESSMENT — PAIN SCALES - GENERAL: PAINLEVEL: MILD PAIN (2)

## 2017-04-12 NOTE — PROGRESS NOTES
Infusion Nursing Note:  Saundra Franks presents today for labs and IVF's.    Patient seen by provider today: No   present during visit today: Not Applicable.    Note: Feeling better after fluids.     Intravenous Access:  Labs drawn without difficulty.  Peripheral IV placed.    Treatment Conditions:  Lab Results   Component Value Date    HGB 9.1 04/12/2017     Lab Results   Component Value Date    WBC 8.0 04/01/2017      Lab Results   Component Value Date    ANEU 5.3 04/01/2017     Lab Results   Component Value Date     04/01/2017      Lab Results   Component Value Date     04/12/2017                   Lab Results   Component Value Date    POTASSIUM 3.7 04/12/2017           Lab Results   Component Value Date    MAG 1.7 08/22/2008            Lab Results   Component Value Date    CR 0.66 04/01/2017                   Lab Results   Component Value Date    MATTHEW 8.7 04/01/2017                Lab Results   Component Value Date    BILITOTAL 0.3 12/08/2011           Lab Results   Component Value Date    ALBUMIN 4.1 12/08/2011                    Lab Results   Component Value Date    ALT 26 12/08/2011           Lab Results   Component Value Date    AST 17 12/08/2011         Post Infusion Assessment:  Patient tolerated infusion without incident.  Blood return noted pre and post infusion.  Site patent and intact, free from redness, edema or discomfort.  No evidence of extravasations.  IV requested to be left in by pt so Right IV was flushed with NS 10 cc's and wrapped with Coban til tomorrow,   Discharge Plan:   Discharge instructions reviewed with: Patient.  Patient and/or family verbalized understanding of discharge instructions and all questions answered.  Copy of AVS reviewed with patient and/or family.  Patient will return 4- for next appointment.  Patient discharged in stable condition accompanied by: self.  Departure Mode: Ambulatory.    Shelia Ho RN

## 2017-04-12 NOTE — MR AVS SNAPSHOT
After Visit Summary   4/12/2017    Saundra Franks    MRN: [id number]           Patient Information     Date Of Birth          1985        Visit Information        Provider Department      4/12/2017 9:00 AM  INFUSION CHAIR 14 [facility]        Today's Diagnoses     Dehydration    -  1    Bariatric surgery status           Follow-ups after your visit        Follow-up notes from your care team     Return in 1 day (on 4/13/2017).      Your next 10 appointments already scheduled     Apr 13, 2017  2:00 PM CDT   Level 2 with  INFUSION CHAIR 12   [facility] ([facility])    [facility]  [address]  [facility] [address] [phone]            Apr 19, 2017  9:00 AM CDT   Post Op with  Wl Diet 1, RD   [facility] ([facility])    [address]  [facility] [address] [phone]              Who to contact     If you have questions or need follow up information about today's clinic visit or your schedule please contact [facility] [place] directly at [phone].  Normal or non-critical lab and imaging results will be communicated to you by Flytivityhart, letter or phone within 4 business days after the clinic has received the results. If you do not hear from us within 7 days, please contact the clinic through Flytivityhart or phone. If you have a critical or abnormal lab result, we will notify you by phone as soon as possible.  Submit refill requests through Gastrofy or call your pharmacy and they will forward the refill request to us. Please allow 3 business days for your refill to be completed.          Additional Information About Your Visit        MyChart Information     Gastrofy gives you secure access to your electronic health record. If you see a primary care provider, you can also  send messages to your care team and make appointments. If you have questions, please call your primary care clinic.  If you do not have a primary care provider, please call [phone] and they will assist you.        Care EveryWhere ID     This is your Care EveryWhere ID. This could be used by other organizations to access your [facility] medical records  [id number]        Your Vitals Were     Pulse Temperature Respirations Last Period          68 98.2  F (36.8  C) (Oral) 14 (LMP Unknown)         Blood Pressure from Last 3 Encounters:   04/12/17 121/72   04/06/17 108/61   04/02/17 109/57    Weight from Last 3 Encounters:   04/06/17 114.2 kg (251 lb 12.8 oz)   04/01/17 121.1 kg (267 lb)   03/28/17 123.7 kg (272 lb 12.8 oz)              We Performed the Following     Electrolyte panel     Hemoglobin     Nursing Communication 1     Nursing Communication 2        Primary Care Provider Office Phone # Fax #    Paulo Martin [phone]       [place] [address]        Thank you!     Thank you for choosing [facility] AND [facility]  for your care. Our goal is always to provide you with excellent care. Hearing back from our patients is one way we can continue to improve our services. Please take a few minutes to complete the written survey that you may receive in the mail after your visit with us. Thank you!             Your Updated Medication List - Protect others around you: Learn how to safely use, store and throw away your medicines at www.disposemymeds.org.          This list is accurate as of: 4/12/17 12:41 PM.  Always use your most recent med list.                   Brand Name Dispense Instructions for use    B-D U/F 31G X 8 MM   Generic drug:  insulin pen needle      Reported on 3/28/2017       BUSPIRONE HCL PO      Take 10 mg by mouth 2 times daily       CALCIUM PO      Take 1 tablet by mouth 3 times daily       cyanocobalamin 1000  "MCG/ML injection    VITAMIN B12    3 mL    Inject 1 mL (1,000 mcg) Subcutaneous every 30 days       CYMBALTA PO      Take 60 mg by mouth daily       GABAPENTIN PO      Take 400 mg by mouth 3 times daily       insulin syringe-needle U-100 30G X 5/16\" 1 ML      Reported on 3/28/2017       LOSARTAN POTASSIUM PO      Take 100 mg by mouth every evening       METFORMIN HCL PO      Take 1,000 mg by mouth 2 times daily (with meals) Reported on 4/6/2017       MULTIVITAMIN ADULT PO      Take 1 tablet by mouth 2 times daily Woman's Alive MVI       NovoLOG MIX 70/30 FLEXPEN injection   Generic drug:  insulin aspart prot & aspart      Inject 60 Units Subcutaneous 2 times daily (before meals) Taking 1 unit PRN       ondansetron 4 MG ODT tab    ZOFRAN-ODT    20 tablet    Take 1 tablet (4 mg) by mouth every 6 hours as needed for nausea       ONE TOUCH LANCETS Misc          ONE TOUCH ULTRA test strip   Generic drug:  blood glucose monitoring          oxyCODONE 5 MG/5ML solution    ROXICODONE    150 mL    Take 5 mLs (5 mg) by mouth every 6 hours as needed for moderate to severe pain       ranitidine 75 MG tablet    ZANTAC    60 tablet    Take 1 tablet (75 mg) by mouth 2 times daily       SUMATRIPTAN SUCCINATE PO      Take 100 mg by mouth every 2 hours as needed for migraine Reported on 3/28/2017       syringe/needle (disp) 25G X 1\" 3 ML Misc     3 each    Use for B-12 injection       TYLENOL PO      Take 325-650 mg by mouth every 12 hours       VITAMIN D (CHOLECALCIFEROL) PO      Take 1 tablet by mouth daily Cap is a 6000 international unit cap         "

## 2017-04-13 ENCOUNTER — INFUSION THERAPY VISIT (OUTPATIENT)
Dept: INFUSION THERAPY | Facility: CLINIC | Age: 32
End: 2017-04-13
Attending: SURGERY
Payer: COMMERCIAL

## 2017-04-13 VITALS
SYSTOLIC BLOOD PRESSURE: 120 MMHG | DIASTOLIC BLOOD PRESSURE: 75 MMHG | RESPIRATION RATE: 16 BRPM | TEMPERATURE: 98.8 F | HEART RATE: 67 BPM

## 2017-04-13 DIAGNOSIS — E86.0 DEHYDRATION: Primary | ICD-10-CM

## 2017-04-13 DIAGNOSIS — Z98.84 BARIATRIC SURGERY STATUS: ICD-10-CM

## 2017-04-13 PROCEDURE — 25000125 ZZHC RX 250: Performed by: PHYSICIAN ASSISTANT

## 2017-04-13 PROCEDURE — 96365 THER/PROPH/DIAG IV INF INIT: CPT

## 2017-04-13 PROCEDURE — 96375 TX/PRO/DX INJ NEW DRUG ADDON: CPT

## 2017-04-13 PROCEDURE — 96361 HYDRATE IV INFUSION ADD-ON: CPT

## 2017-04-13 PROCEDURE — 25800025 ZZH RX 258: Performed by: PHYSICIAN ASSISTANT

## 2017-04-13 PROCEDURE — 96367 TX/PROPH/DG ADDL SEQ IV INF: CPT

## 2017-04-13 PROCEDURE — 25000128 H RX IP 250 OP 636: Performed by: PHYSICIAN ASSISTANT

## 2017-04-13 RX ADMIN — SODIUM CHLORIDE, POTASSIUM CHLORIDE, SODIUM LACTATE AND CALCIUM CHLORIDE 1000 ML: 600; 310; 30; 20 INJECTION, SOLUTION INTRAVENOUS at 14:28

## 2017-04-13 RX ADMIN — ASCORBIC ACID, VITAMIN A PALMITATE, CHOLECALCIFEROL, THIAMINE HYDROCHLORIDE, RIBOFLAVIN-5 PHOSPHATE SODIUM, PYRIDOXINE HYDROCHLORIDE, NIACINAMIDE, DEXPANTHENOL, ALPHA-TOCOPHEROL ACETATE, VITAMIN K1, FOLIC ACID, BIOTIN, CYANOCOBALAMIN: 200; 3300; 200; 6; 3.6; 6; 40; 15; 10; 150; 600; 60; 5 INJECTION, SOLUTION INTRAVENOUS at 15:48

## 2017-04-13 RX ADMIN — ONDANSETRON HYDROCHLORIDE 4 MG: 2 INJECTION, SOLUTION INTRAVENOUS at 14:37

## 2017-04-13 RX ADMIN — PANTOPRAZOLE SODIUM 40 MG: 40 INJECTION, POWDER, FOR SOLUTION INTRAVENOUS at 14:30

## 2017-04-13 ASSESSMENT — PAIN SCALES - GENERAL: PAINLEVEL: MODERATE PAIN (4)

## 2017-04-13 NOTE — MR AVS SNAPSHOT
After Visit Summary   4/13/2017    Saundra Franks    MRN: [id number]           Patient Information     Date Of Birth          1985        Visit Information        Provider Department      4/13/2017 2:00 PM  INFUSION CHAIR 12 [facility]        Today's Diagnoses     Dehydration    -  1    Bariatric surgery status           Follow-ups after your visit        Follow-up notes from your care team     Return if symptoms worsen or fail to improve.      Your next 10 appointments already scheduled     Apr 19, 2017  9:00 AM CDT   Post Op with  Wl Diet 1, RD   [facility] ([facility])    [address] [place] [address] [phone]              Who to contact     If you have questions or need follow up information about today's clinic visit or your schedule please contact [facility] directly at [phone].  Normal or non-critical lab and imaging results will be communicated to you by Wool and [employer] Ganghart, letter or phone within 4 business days after the clinic has received the results. If you do not hear from us within 7 days, please contact the clinic through [employer] or phone. If you have a critical or abnormal lab result, we will notify you by phone as soon as possible.  Submit refill requests through ACSIAN or call your pharmacy and they will forward the refill request to us. Please allow 3 business days for your refill to be completed.          Additional Information About Your Visit        MyChart Information     ACSIAN gives you secure access to your electronic health record. If you see a primary care provider, you can also send messages to your care team and make appointments. If you have questions, please call your primary care clinic.  If you do not have a primary care provider, please call [phone] and they will assist you.        Care EveryWhere  "ID     This is your Care EveryWhere ID. This could be used by other organizations to access your [place] medical records  [id number]        Your Vitals Were     Pulse Temperature Respirations Last Period          67 98.8  F (37.1  C) (Oral) 16 (LMP Unknown)         Blood Pressure from Last 3 Encounters:   04/13/17 120/75   04/12/17 121/72   04/06/17 108/61    Weight from Last 3 Encounters:   04/06/17 114.2 kg (251 lb 12.8 oz)   04/01/17 121.1 kg (267 lb)   03/28/17 123.7 kg (272 lb 12.8 oz)              Today, you had the following     No orders found for display       Primary Care Provider Office Phone # Fax #    Paulo Martin [phone]       [address] [address]        Thank you!     Thank you for choosing [facility]  for your care. Our goal is always to provide you with excellent care. Hearing back from our patients is one way we can continue to improve our services. Please take a few minutes to complete the written survey that you may receive in the mail after your visit with us. Thank you!             Your Updated Medication List - Protect others around you: Learn how to safely use, store and throw away your medicines at www.disposemymeds.org.          This list is accurate as of: 4/13/17  5:18 PM.  Always use your most recent med list.                   Brand Name Dispense Instructions for use    B-D U/F 31G X 8 MM   Generic drug:  insulin pen needle      Reported on 3/28/2017       BUSPIRONE HCL PO      Take 10 mg by mouth 2 times daily       CALCIUM PO      Take 1 tablet by mouth 3 times daily       cyanocobalamin 1000 MCG/ML injection    VITAMIN B12    3 mL    Inject 1 mL (1,000 mcg) Subcutaneous every 30 days       CYMBALTA PO      Take 60 mg by mouth daily       GABAPENTIN PO      Take 400 mg by mouth 3 times daily       insulin syringe-needle U-100 30G X 5/16\" 1 ML      Reported on 3/28/2017       " "LOSARTAN POTASSIUM PO      Take 100 mg by mouth every evening       METFORMIN HCL PO      Take 1,000 mg by mouth 2 times daily (with meals) Reported on 4/6/2017       MULTIVITAMIN ADULT PO      Take 1 tablet by mouth 2 times daily Woman's Alive MVI       NovoLOG MIX 70/30 FLEXPEN injection   Generic drug:  insulin aspart prot & aspart      Inject 60 Units Subcutaneous 2 times daily (before meals) Taking 1 unit PRN       ondansetron 4 MG ODT tab    ZOFRAN-ODT    20 tablet    Take 1 tablet (4 mg) by mouth every 6 hours as needed for nausea       ONE TOUCH LANCETS Misc          ONE TOUCH ULTRA test strip   Generic drug:  blood glucose monitoring          oxyCODONE 5 MG/5ML solution    ROXICODONE    150 mL    Take 5 mLs (5 mg) by mouth every 6 hours as needed for moderate to severe pain       ranitidine 75 MG tablet    ZANTAC    60 tablet    Take 1 tablet (75 mg) by mouth 2 times daily       SUMATRIPTAN SUCCINATE PO      Take 100 mg by mouth every 2 hours as needed for migraine Reported on 3/28/2017       syringe/needle (disp) 25G X 1\" 3 ML Misc     3 each    Use for B-12 injection       TYLENOL PO      Take 325-650 mg by mouth every 12 hours       VITAMIN D (CHOLECALCIFEROL) PO      Take 1 tablet by mouth daily Cap is a 6000 international unit cap         "

## 2017-04-13 NOTE — PROGRESS NOTES
Infusion Nursing Note:  Saundra Franks presents today for IVF's.    Patient seen by provider today: No   present during visit today: Not Applicable.    Note: Still not feeling very well sl dizzy sl nauseated.    Intravenous Access:  Peripheral IV placed.4- - site with good blood return     Treatment Conditions:  Not Applicable.      Post Infusion Assessment:  Patient tolerated infusion without incident.  Blood return noted pre and post infusion.  Site patent and intact, free from redness, edema or discomfort.  No evidence of extravasations.  Access discontinued per protocol.    Discharge Plan:   Discharge instructions reviewed with: Patient.  Patient and/or family verbalized understanding of discharge instructions and all questions answered.  Copy of AVS reviewed with patient and/or family.  Patient will return prn for next appointment.  Patient discharged in stable condition accompanied by: self.  Departure Mode: Ambulatory.    Shelia Ho RN

## 2017-04-14 ENCOUNTER — TELEPHONE (OUTPATIENT)
Dept: SURGERY | Facility: CLINIC | Age: 32
End: 2017-04-14

## 2017-04-14 NOTE — TELEPHONE ENCOUNTER
"Called patient after getting [employer] message about abdominal pain.  She stated that after thinking about it she thinks it is muscle pain.  Says it is all over abdomen and worse with movement.  Gets up to 3/10.  At rest goes down to 1-2/10 for pain.  Eating ok.  Had IVF and by 1pm today has gotten in 20 oz of fluid.  Going to try for 40 + today.  Taking occasional tylenol for abdominal pain  Taking 1 \"here and there\".      Discussed that this is normal to have muscle pain 3 weeks out from major surgery.  Advised continuing with tylenol and heat.  Contact clinic if have any concerns.  She verbalized understanding.   "

## 2017-04-19 ENCOUNTER — OFFICE VISIT (OUTPATIENT)
Dept: SURGERY | Facility: CLINIC | Age: 32
End: 2017-04-19
Payer: COMMERCIAL

## 2017-04-19 DIAGNOSIS — Z98.84 BARIATRIC SURGERY STATUS: ICD-10-CM

## 2017-04-19 DIAGNOSIS — E66.01 MORBID OBESITY WITH BMI OF 40.0-44.9, ADULT (H): ICD-10-CM

## 2017-04-19 PROCEDURE — 97803 MED NUTRITION INDIV SUBSEQ: CPT | Performed by: DIETITIAN, REGISTERED

## 2017-04-19 NOTE — PROGRESS NOTES
"POST-OPERATIVE NUTRITION APPOINTMENT  DATE OF VISIT: 2017  Name: YOSEPH CRISTOBAL  : 1985  Gender: Female  MRN: [id number]  Age: 31    ASSESSMENT:  REASON FOR VISIT:  YOSEPH CRISTOBAL is a 31 year old Female presents today for 4th week PO nutrition follow-up appointment.  DIAGNOSIS:  Status post Laparoscopic Anamaria-en-Y Gastric Bypass surgery.  Obesity Class III  ANTHROPOMETRICS:  Height: 64.5 inches  Weight: 239.2 lbs  BMI: 40.4 kg/m2  VITAMINS AND MINERALS:  1 Multivitamin/ mineral - morning and afternoon  500 mg Calcium - 3x/day (takes 1 hour away from multivitamin)  Vitamin D - 2000 international units 1x/day  vitamin B-12 injection 1 time per month  no iron needed per clinic guidelines, but takes ferrous sulfate + 500 mg vitamin C-bed time  NUTRITION HISTORY:  Breakfast: 1 scrambled eggs, 1 sausage link or protein drink (~ 20 g protein)   Lunch: ~1/2 cup wild rice soup (not pureed)  Supper: 1-2 oz baked chicken with Shake and Bake (no pureed), mashed potatoes  Snacks: No  Beverages:water, crystal light - 40 oz  Consuming liquid calories: Protein supplements/shakes  Protein intake: 30-50 grams/day  Tolerate regular texture food: No  Any foods not tolerated details: toast  Portion size: 1/2 - 1 cup  Take 30 minutes to consume each meal: Yes  Eat protein foods first: Yes  Fluids and meals separate by at least 30 minutes: Yes  Chew foods 20 plus times: Yes  Tolerating diet: bariatric pureed diet  Drinking high protein supplements/shakes: Yes  Consuming meals per day: 3  Consuming snacks per day: 0  Comment: had lengthy conversation about not 'jumping ahead\" on textures (pt has been eating chicken, sausage, wild rice soup, toast-vomited X 1); pt complaining of back pain and more acid reflux, but did not want to wait to talk with a provider; encouraged pt to call the nurse line with these concerns    PHYSICAL ACTIVITY:  Type: Walking  Frequency: 5-6 times a week  Duration (min): 45  DIAGNOSIS:  Previous " Nutrition Diagnosis: Altered gastrointestinal function related to alteration in gastrointestinal structure as evidenced by history of Laparoscopic Anamaria-en-Y Gastric Bypass surgery.  Previous goals:  Start puree diet at day 14 post op-met  Increase to solids at day 28 post op-not met (started sooner)  Continue MVI, Calcium with vitamin D, B12, vitamin D. Start Iron and vitamin C-met  Aim for 60 to 70 grams protein-not met  Increase 48 to 64 oz of fluid per day-not met    Current Nutrition Diagnosis: Altered gastrointestinal function related to alteration in gastrointestinal structure as evidenced by history of Laparoscopic Anamaria-en-Y Gastric Bypass   Unchanged  INTERVENTION:  Nutrition Prescription: Eat 3 meals a day at regular intervals. Consume 60-90 grams of protein daily. Follow post-surgical vitamins and minerals protocol.  Goals:  Take calcium 2 hours away from multivitamin/ mineral  Start on soft foods now (explained the importance of not jumping ahead); at 8-12 weeks transition to regular textures  Increase fluids to 48-64 oz per day  Aim to consume 60-90 g protein per day (have 1 protein drink per day since does not like milk)  Implementation: Discussed progress toward previous goals; reinforced importance of following bariatric lifestyle changes  NUTRITION MONITORING AND EVALUATION:  Anticipated compliance: fair  Verbalized understanding by stating appropriate bariatric serving sizes    Follow up:  Patient to follow up in 2 months  TIME SPENT WITH PATIENT:  25 minutes  Alessandro Olson RD, LD  [facility]  [phone]

## 2017-04-19 NOTE — MR AVS SNAPSHOT
MRN:[id number]                      After Visit Summary   4/19/2017    Saundra Franks    MRN: [id number]           Visit Information        Provider Department      4/19/2017 9:00 AM 1, Jeyson Ford, RODRIGUE [facility]      Your next 10 appointments already scheduled     Jun 16, 2017  9:00 AM CDT   Return Visit with Jeyson Ford 3, RD   [facility] ([facility])    [address] [place] [address] [phone]            Jun 16, 2017  9:30 AM CDT   Return Visit with Melvina Flores PA-C   [facility] ([facility])    [address] [place] [address] [phone]              MyChart Information     [employer] gives you secure access to your electronic health record. If you see a primary care provider, you can also send messages to your care team and make appointments. If you have questions, please call your primary care clinic.  If you do not have a primary care provider, please call [phone] and they will assist you.        Care EveryWhere ID     This is your Care EveryWhere ID. This could be used by other organizations to access your [facility] medical records  [id number]

## 2017-04-26 ENCOUNTER — TELEPHONE (OUTPATIENT)
Dept: SURGERY | Facility: CLINIC | Age: 32
End: 2017-04-26

## 2017-04-26 DIAGNOSIS — R11.0 NAUSEA: ICD-10-CM

## 2017-04-26 DIAGNOSIS — Z98.84 BARIATRIC SURGERY STATUS: Primary | ICD-10-CM

## 2017-04-26 RX ORDER — ONDANSETRON 4 MG/1
4 TABLET, ORALLY DISINTEGRATING ORAL EVERY 6 HOURS PRN
Qty: 30 TABLET | Refills: 0 | Status: SHIPPED | OUTPATIENT
Start: 2017-04-26

## 2017-04-26 NOTE — TELEPHONE ENCOUNTER
Called patient after request for more zofran.  She said she is vomiting about 1 sometimes 2 times daily.  With and without meals.  It has been this way for a while and not getting worse.  Is eating.  Getting in about 40 oz of fluid daily.  No abdominal pain.  The nausea comes right before she vomits.  Is not nauseated all day.      Sent over refill request for zofran.  Advised patient to let us know if she is not better or worse.  She verbalized understanding.

## 2017-05-08 NOTE — DISCHARGE SUMMARY
"Discharge Summary    Saundra Franks MRN# [id number]   YOB: 1985 Age: 31 year old     Date of Admission:  4/1/2017  Date of Discharge:  4/2/2017  6:45 PM  Admitting Physician:  Bladimir Alfaro MD  Discharge Physician:  No att. providers found  Discharging Service:  Surgery     Primary Provider: Paulo Martin          Admission Diagnoses:   GI bleed          Discharge Diagnosis:   same             Discharge Disposition:   Discharged to home           Condition on Discharge:   Discharge condition: Stable   Discharge vitals: Blood pressure 109/57, pulse 95, temperature 98.2  F (36.8  C), temperature source Oral, resp. rate 18, height 5' 4\" (1.626 m), weight 267 lb (121.1 kg), SpO2 96 %, not currently breastfeeding.                 Procedures / Labs / Imaging:   No procedures performed during this admission          Medications Prior to Admission:     Novalog, oxycodone, zofran, zantac, gabapentin, losartan, buspirone, calcium, vitamin D, metformin.          Discharge Medications:   No medications were prescribed on discharge          Consultations:   Consultation during this admission received from internal medicine             Brief History of Illness:   Saundra Franks is a 31 year old female who was admitted for melanotic stools 9 days after bariatric operation.          Hospital Course:   Patient was admitted for observation, her hemoglobin was serially checked and stabilized without intervention.               Significant Results:   Hgb 8 to 7.6              Pending Results:   None           Discharge Instructions and Follow-Up:   Discharge diet: Bariatric full liquids   Discharge activity: Lifting restricted to 20 pounds, for 4 weeks   Discharge follow-up: Follow up with bariatric clinic in 1 week   Outpatient therapy: None    Home Care agency: None    Supplies and equipment: None   Lines and drains: None    Wound care: None   Other instructions: None        "

## 2017-05-16 ENCOUNTER — TELEPHONE (OUTPATIENT)
Dept: SURGERY | Facility: CLINIC | Age: 32
End: 2017-05-16

## 2017-05-16 NOTE — TELEPHONE ENCOUNTER
Called patient to see how she is doing.  She said she is doing better.  Vomiting less.  Not on a daily basis.  Does not need zofran regularly.  Drinking about 50+ oz of water daily.  So overall doing much better.  Reminded her about upcoming appointment.  Patient verbalized understanding.

## 2017-06-16 ENCOUNTER — HOSPITAL ENCOUNTER (OUTPATIENT)
Dept: LAB | Facility: CLINIC | Age: 32
Discharge: HOME | End: 2017-06-16
Attending: PHYSICIAN ASSISTANT | Admitting: PHYSICIAN ASSISTANT
Payer: COMMERCIAL

## 2017-06-16 ENCOUNTER — OFFICE VISIT (OUTPATIENT)
Dept: SURGERY | Facility: CLINIC | Age: 32
End: 2017-06-16
Payer: COMMERCIAL

## 2017-06-16 VITALS
WEIGHT: 223 LBS | DIASTOLIC BLOOD PRESSURE: 74 MMHG | HEART RATE: 83 BPM | SYSTOLIC BLOOD PRESSURE: 116 MMHG | BODY MASS INDEX: 38.28 KG/M2

## 2017-06-16 DIAGNOSIS — Z79.4 TYPE 2 DIABETES MELLITUS WITH DIABETIC POLYNEUROPATHY, WITH LONG-TERM CURRENT USE OF INSULIN (H): ICD-10-CM

## 2017-06-16 DIAGNOSIS — Z98.84 BARIATRIC SURGERY STATUS: Primary | ICD-10-CM

## 2017-06-16 DIAGNOSIS — Z98.84 BARIATRIC SURGERY STATUS: ICD-10-CM

## 2017-06-16 DIAGNOSIS — K91.2 POSTSURGICAL MALABSORPTION: ICD-10-CM

## 2017-06-16 DIAGNOSIS — K21.9 GASTROESOPHAGEAL REFLUX DISEASE WITHOUT ESOPHAGITIS: ICD-10-CM

## 2017-06-16 DIAGNOSIS — G47.33 OSA (OBSTRUCTIVE SLEEP APNEA): ICD-10-CM

## 2017-06-16 DIAGNOSIS — E66.9 OBESITY (BMI 30-39.9): ICD-10-CM

## 2017-06-16 DIAGNOSIS — E66.01 MORBID OBESITY WITH BMI OF 40.0-44.9, ADULT (H): ICD-10-CM

## 2017-06-16 DIAGNOSIS — E11.42 TYPE 2 DIABETES MELLITUS WITH DIABETIC POLYNEUROPATHY, WITH LONG-TERM CURRENT USE OF INSULIN (H): ICD-10-CM

## 2017-06-16 PROBLEM — E86.0 DEHYDRATION: Status: RESOLVED | Noted: 2017-04-11 | Resolved: 2017-06-16

## 2017-06-16 LAB
DEPRECATED CALCIDIOL+CALCIFEROL SERPL-MC: 49 UG/L (ref 20–75)
PTH-INTACT SERPL-MCNC: 82 PG/ML (ref 12–72)
VIT B12 SERPL-MCNC: 403 PG/ML (ref 193–986)

## 2017-06-16 PROCEDURE — 83970 ASSAY OF PARATHORMONE: CPT | Performed by: PHYSICIAN ASSISTANT

## 2017-06-16 PROCEDURE — 82607 VITAMIN B-12: CPT | Performed by: PHYSICIAN ASSISTANT

## 2017-06-16 PROCEDURE — 82306 VITAMIN D 25 HYDROXY: CPT | Performed by: PHYSICIAN ASSISTANT

## 2017-06-16 PROCEDURE — 36415 COLL VENOUS BLD VENIPUNCTURE: CPT | Performed by: PHYSICIAN ASSISTANT

## 2017-06-16 PROCEDURE — 99214 OFFICE O/P EST MOD 30 MIN: CPT | Performed by: PHYSICIAN ASSISTANT

## 2017-06-16 PROCEDURE — 97803 MED NUTRITION INDIV SUBSEQ: CPT | Performed by: DIETITIAN, REGISTERED

## 2017-06-16 NOTE — PROGRESS NOTES
Bariatric Follow Up Visit     Date: 2017    RE: YOSEPH CRISTOBAL  MR#: [id number]  : 1985    HISTORY OF PRESENT ILLNESS: YOSEPH CRISTOBAL returns today for her follow-up appointment status post Laparoscopic Anamaria-en-Y Gastric Bypass surgery. She has lost 64.0 lbs since starting our program. Patient is feeling well. She is doing the following exercise(s): Walking. She exercises for 30 minutes about twice a week. Has trouble doing more due to plantar fascitis. Is planning on having surgery for this in the next month and will be off her foot for 6 weeks following this.     Patient is taking the following bariatric postoperative vitamins:  2 Complete multivitamins with minerals (at different times than calcium)  6000 Int Units of Vitamin D daily   1634-6934 mg of Calcium daily in divided doses  1000 mcg of Vitamin B12 inj monthly  65 mg of Iron/Vit C.twice daily (PCP has told her she can stop her iron supplement)      OBESITY RELATED CONDITIONS:  HTN: improved  DM Type II: improved, sugars in the 130's in the am and 160's at night. 17 HgA1C 7.2, only on humalog prn  GERD: present, on ranitidine 75 mg BID, PCP has refilled this.  MAX: improved, not using CPAP, snoring has improved, stopped using cpap after surgery.   Back Pain: present  Joint Pain: present    SOCIAL HISTORY:  She does not smoke. She does not drink alcohol. She does not attend support group and feels safe in current environment.    REVIEW OF SYSTEMS:  GI:  Nausea-seldom  Vomiting-seldom  Diarrhea-never  Constipation-occasionally, having a BM almost every day.   Dysphagia-never  Abdominal Pains-never  Heartburn-occasionally, about twice weekly. on zantac 75 mg twice daily.   Skin:  Intertriginous Irritation-never    Psychiatric:  Depression never    :    Does not have monthly menses    PHYSICAL EXAMINATION:   Vital Signs: Weight: 223 lbs; BMI: 37.7; BP: 119 / 78 ; Pulse: 85; Resp: 16;  GENERAL: Alert and oriented x3. NAD  HEART: No  murmurs, rubs or gallops, Regular rate and rhythm  LUNGS: Breathing unlabored, Lung sounds clear to auscultation bilaterally  ABDOMEN: soft; nontender; nondistended, incision well healed. No hernia  EXTREMITIES: No LE edema bilaterally, Gait normal  SKIN: No intertriginous irritation or rash    ASSESSMENT:   Morbid Obesity- resolved  Obesity   3 months s/p Laparoscopic Anamaria-en-Y Gastric Bypass  Post surgical malabsorption  MAX  GERD  Type II Diabetes    PLAN:   Reviewed CBC, ferritin, TIBC, and iron labs.  Ordered vitamin B12, vitamin D, PTH.  Recommend she restart using CPAP and Call [facility] to get sleep eval scheduled.   Continue using humalog prn and following blood sugars twice daily. Follow up with PCP in 3 months.   Discussed GERD and increasing does of ranitidine.  PT would like to hold at current dose.  Will recheck again in 3 months.   Follow food plan per dietitian recommendations.  Continue taking recommended post-op vitamins.  Return to clinic in 3 months.      This was a 25 minute visit with greater than 50% spent on counseling.

## 2017-06-16 NOTE — PROGRESS NOTES
POST-OPERATIVE NUTRITION APPOINTMENT  DATE OF VISIT: 2017  Name: YOSEPH CRISTOBAL  : 1985  Gender: Female  MRN: [id number]  Age: 31    ASSESSMENT:  REASON FOR VISIT:  YOSEPH CRISTOBAL is a 31 year old Female presents today for 3 month PO nutrition follow-up appointment.  DIAGNOSIS:  Status post Laparoscopic Anamaria-en-Y Gastric Bypass surgery.  Obesity Class II  ANTHROPOMETRICS:  Height: 64.5 inches  Weight: 223 lbs  BMI: 37.7 kg/m2  VITAMINS AND MINERALS:  Multivitamin - 2 doses, daily   Calcium - 500mg TID   Vitamin D - 6000 international units, daily   Vitamin B12 - injections, monthly   Iron - 65mg BID w/Vitamin C  NUTRITION HISTORY:  Breakfast: eggs or protein drink   Lunch: egg roll or slice of chicken   Supper: fish with rice-a-hoda  Snacks: No  Beverages: water and crystal light; ~48oz  Consuming liquid calories: Protein supplements/shakes  Protein intake: 60-90 grams/day  Tolerate regular texture food: Yes  Any foods not tolerated details: sometimes hamburger  Portion size: 1 cup  Take 30 minutes to consume each meal: No  Eat protein foods first: Yes  Fluids and meals separate by at least 30 minutes: Yes  Chew foods 20 plus times: Yes  Tolerating diet: bariatric regular diet  Drinking high protein supplements/shakes: Yes  Consuming meals per day: 3  Consuming snacks per day: none  Comment: Pt with good tolerance to most foods. Admits she's gotten away from  her meals/fluids, and not exercising as much as she should. Has had migraines the last week and didn't exercise. Has foot surgery next week and is anticipating ~6 weeks off her feet. Had discussion about utilizing chair exercises until able to walk again.     PHYSICAL ACTIVITY:  Type: Walking  Frequency: 2-3 times a week  Duration (min): 30  DIAGNOSIS:  Previous Nutrition Diagnosis: Altered gastrointestinal function related to alteration in gastrointestinal structure as evidenced by history of Laparoscopic Anamaria-en-Y Gastric Bypass  surgery.  Unchanged, modified below.   Previous goals:  Take calcium 2 hours away from multivitamin/ mineral - met   Start on soft foods now (explained the importance of not jumping ahead); at 8-12 weeks transition to regular textures - met   Increase fluids to 48-64 oz per day - improving  Aim to consume 60-90 g protein per day - improving  Current Nutrition Diagnosis: Altered gastrointestinal function related to alteration in gastrointestinal structure as evidenced by history of Laparoscopic Anamaria-en-Y Gastric Bypass   Unchanged  INTERVENTION:  Nutrition Prescription: Eat 3 meals a day at regular intervals. Consume 60-90 grams of protein daily. Follow post-surgical vitamins and minerals protocol.  Goals:  Consistently separate fluids and meals by 30 minutes.   Aim for 64 oz of fluid each day   Walk for 30 minutes, 4x/week. OR utilize chair exercises 4x/week.   Implementation: Discussed progress toward previous goals; reinforced importance of following bariatric lifestyle changes  NUTRITION MONITORING AND EVALUATION:  Anticipated compliance: Fair to good  Verbalized understanding by discussing the importance of appropriate portions and  fluids/meals.     Follow up: Continue to monitor pt closely regarding wt loss and diet  Patient to follow up in 3 months, at 6 months post-op  TIME SPENT WITH PATIENT:  20 minutes  Letha Diaz RD, LD  Clinical Dietitian

## 2017-06-16 NOTE — MR AVS SNAPSHOT
After Visit Summary   6/16/2017    Saundra Franks    MRN: [id number]           Patient Information     Date Of Birth          1985        Visit Information        Provider Department      6/16/2017 9:30 AM Melvina Flores PA-C [facility]      Today's Diagnoses     Bariatric surgery status    -  1    Obesity (BMI 30-39.9)        Postsurgical malabsorption        Type 2 diabetes mellitus with diabetic polyneuropathy, with long-term current use of insulin (H)        Gastroesophageal reflux disease without esophagitis        MAX (obstructive sleep apnea)           Follow-ups after your visit        Follow-up notes from your care team     Return in 3 months (on 9/16/2017).      Your next 10 appointments already scheduled     Sep 28, 2017 11:00 AM CDT   Return Visit with Melvina Flores PA-C   [facility] ([facility])    [address] [phone]            Sep 28, 2017 11:30 AM CDT   Return Visit with  Dayron Diet 3, RD   [facility] ([facility])    [address] [phone]              Future tests that were ordered for you today     Open Future Orders        Priority Expected Expires Ordered    Vitamin B12 Routine 6/16/2017 6/16/2018 6/16/2017    Vitamin D Screen Routine 6/16/2017 6/16/2018 6/16/2017    Parathyroid Hormone Intact Routine 6/16/2017 6/16/2018 6/16/2017            Who to contact     If you have questions or need follow up information about today's clinic visit or your schedule please contact [facility] directly at [phone].  Normal or non-critical lab and imaging results will be communicated to you by MyChart, letter or phone within 4 business  days after the clinic has received the results. If you do not hear from us within 7 days, please contact the clinic through [email or web address] or phone. If you have a critical or abnormal lab result, we will notify you by phone as soon as possible.  Submit refill requests through [email or web address] or call your pharmacy and they will forward the refill request to us. Please allow 3 business days for your refill to be completed.          Additional Information About Your Visit        [employer] Information     [email or web address] gives you secure access to your electronic health record. If you see a primary care provider, you can also send messages to your care team and make appointments. If you have questions, please call your primary care clinic.  If you do not have a primary care provider, please call [phone] and they will assist you.        Care EveryWhere ID     This is your Care EveryWhere ID. This could be used by other organizations to access your [facility] medical records  [id number]        Your Vitals Were     Pulse Last Period BMI (Body Mass Index)             83 (LMP Unknown) 38.28 kg/m2          Blood Pressure from Last 3 Encounters:   06/16/17 116/74   04/13/17 120/75   04/12/17 121/72    Weight from Last 3 Encounters:   06/16/17 223 lb (101.2 kg)   04/06/17 251 lb 12.8 oz (114.2 kg)   04/01/17 267 lb (121.1 kg)              We Performed the Following     OP ROOMING NOTE TO AMADO        Primary Care Provider Office Phone # Fax #    Paulo Martin [phone]       [address] [address]        Thank you!     Thank you for choosing [facility]  for your care. Our goal is always to provide you with excellent care. Hearing back from our patients is one way we can continue to improve our services. Please take a few minutes to complete the written survey that you may receive in the mail after your visit with us. Thank you!             Your  "Updated Medication List - Protect others around you: Learn how to safely use, store and throw away your medicines at www.disposemymeds.org.          This list is accurate as of: 6/16/17 12:08 PM.  Always use your most recent med list.                   Brand Name Dispense Instructions for use    B-D U/F 31G X 8 MM   Generic drug:  insulin pen needle      Reported on 3/28/2017       BUSPIRONE HCL PO      Take 10 mg by mouth 2 times daily       CALCIUM PO      Take 1 tablet by mouth 3 times daily       cyanocobalamin 1000 MCG/ML injection    VITAMIN B12    3 mL    Inject 1 mL (1,000 mcg) Subcutaneous every 30 days       * CYMBALTA PO      Take 60 mg by mouth daily       * DULOXETINE HCL PO      Take 30 mg by mouth daily       GABAPENTIN PO      Take 400 mg by mouth 3 times daily       insulin syringe-needle U-100 30G X 5/16\" 1 ML      Reported on 3/28/2017       LOSARTAN POTASSIUM PO      Take 100 mg by mouth every evening       MULTIVITAMIN ADULT PO      Take 1 tablet by mouth 2 times daily [employer] MVI       NovoLOG MIX 70/30 FLEXPEN injection   Generic drug:  insulin aspart prot & aspart      Inject 2-3 Units Subcutaneous 2 times daily as needed Taking 2-3 unit PRN before meals in sugars are above 140 in the am       * ondansetron 4 MG ODT tab    ZOFRAN-ODT    20 tablet    Take 1 tablet (4 mg) by mouth every 6 hours as needed for nausea       * ondansetron 4 MG ODT tab    ZOFRAN-ODT    30 tablet    Take 1 tablet (4 mg) by mouth every 6 hours as needed for nausea       ONE TOUCH LANCETS Misc          ONE TOUCH ULTRA test strip   Generic drug:  blood glucose monitoring          ranitidine 75 MG tablet    ZANTAC    60 tablet    Take 1 tablet (75 mg) by mouth 2 times daily       SUMATRIPTAN SUCCINATE PO      Take 100 mg by mouth every 2 hours as needed for migraine Reported on 3/28/2017       syringe/needle (disp) 25G X 1\" 3 ML Misc     3 each    Use for B-12 injection       TYLENOL PO      Take 325-650 mg by " mouth every 12 hours       VITAMIN D (CHOLECALCIFEROL) PO      Take 1 tablet by mouth daily Cap is a 6000 international unit cap       * Notice:  This list has 4 medication(s) that are the same as other medications prescribed for you. Read the directions carefully, and ask your doctor or other care provider to review them with you.

## 2017-06-16 NOTE — MR AVS SNAPSHOT
MRN:[id number]                      After Visit Summary   6/16/2017    Saundra Franks    MRN: [id number]           Visit Information        Provider Department      6/16/2017 9:00 AM 3, Sh Dayron Ford, RODRIGUE [facility]      MyChart Information     [employer] gives you secure access to your electronic health record. If you see a primary care provider, you can also send messages to your care team and make appointments. If you have questions, please call your primary care clinic.  If you do not have a primary care provider, please call [phone] and they will assist you.        Care EveryWhere ID     This is your Care EveryWhere ID. This could be used by other organizations to access your [facility] medical records  [id number]

## 2018-03-13 ENCOUNTER — TELEPHONE (OUTPATIENT)
Dept: SURGERY | Facility: CLINIC | Age: 33
End: 2018-03-13

## 2018-03-13 NOTE — TELEPHONE ENCOUNTER
Suma from [facility] pharmacy called and is wondering why we haven't responded to faxes for medication request.  Stated incorrect fax number and correct number given for her to fax request.  Latha Burgess, MS, RD, RN

## 2018-06-23 ENCOUNTER — HEALTH MAINTENANCE LETTER (OUTPATIENT)
Age: 33
End: 2018-06-23

## 2019-09-29 ENCOUNTER — HEALTH MAINTENANCE LETTER (OUTPATIENT)
Age: 34
End: 2019-09-29

## 2019-12-09 ENCOUNTER — TELEPHONE (OUTPATIENT)
Dept: SURGERY | Facility: CLINIC | Age: 34
End: 2019-12-09

## 2019-12-09 DIAGNOSIS — K91.2 POSTOPERATIVE MALABSORPTION: ICD-10-CM

## 2019-12-09 DIAGNOSIS — Z98.84 BARIATRIC SURGERY STATUS: ICD-10-CM

## 2019-12-09 DIAGNOSIS — E66.01 MORBID OBESITY, UNSPECIFIED OBESITY TYPE (H): ICD-10-CM

## 2019-12-09 NOTE — TELEPHONE ENCOUNTER
"[id number] for subcutaneous injection syringes + 25 gauge 1 \" needles for Vitamin B-12 injections.  Pt not seen since 3 month postop visit 6/16/17.  Refill denied based on clinic protocol for vitamins in a patient that has not been seen in over a year.      Called pt to update on need for appointment.  Phone number on file did not ring, message on phone states number no longer available.  Unable to leave any voicemail.     Called pharmacy, spoke to Les.  Updated on pt need to be seen before pt can get refill.  Les states she will call pt to update her.  Gregoria Chambers RN on 12/9/2019 at 1:59 PM            "

## 2021-01-14 ENCOUNTER — HEALTH MAINTENANCE LETTER (OUTPATIENT)
Age: 36
End: 2021-01-14

## 2021-08-28 ENCOUNTER — HEALTH MAINTENANCE LETTER (OUTPATIENT)
Age: 36
End: 2021-08-28

## 2021-10-23 ENCOUNTER — HEALTH MAINTENANCE LETTER (OUTPATIENT)
Age: 36
End: 2021-10-23

## 2022-02-12 ENCOUNTER — HEALTH MAINTENANCE LETTER (OUTPATIENT)
Age: 37
End: 2022-02-12

## 2022-04-09 ENCOUNTER — HEALTH MAINTENANCE LETTER (OUTPATIENT)
Age: 37
End: 2022-04-09

## 2022-10-10 ENCOUNTER — HEALTH MAINTENANCE LETTER (OUTPATIENT)
Age: 37
End: 2022-10-10

## 2023-03-25 ENCOUNTER — HEALTH MAINTENANCE LETTER (OUTPATIENT)
Age: 38
End: 2023-03-25

## 2023-05-27 ENCOUNTER — HEALTH MAINTENANCE LETTER (OUTPATIENT)
Age: 38
End: 2023-05-27

## 2024-01-07 ENCOUNTER — HEALTH MAINTENANCE LETTER (OUTPATIENT)
Age: 39
End: 2024-01-07

## 2024-09-09 ENCOUNTER — OFFICE VISIT (OUTPATIENT)
Dept: URGENT CARE | Facility: URGENT CARE | Age: 39
End: 2024-09-09
Payer: OTHER MISCELLANEOUS

## 2024-09-09 VITALS
WEIGHT: 167.6 LBS | SYSTOLIC BLOOD PRESSURE: 124 MMHG | RESPIRATION RATE: 14 BRPM | DIASTOLIC BLOOD PRESSURE: 81 MMHG | OXYGEN SATURATION: 99 % | HEART RATE: 68 BPM | TEMPERATURE: 97.9 F

## 2024-09-09 DIAGNOSIS — S00.03XA CONTUSION OF SCALP, INITIAL ENCOUNTER: Primary | ICD-10-CM

## 2024-09-09 PROCEDURE — 99203 OFFICE O/P NEW LOW 30 MIN: CPT | Performed by: PHYSICIAN ASSISTANT

## 2024-09-09 RX ORDER — IBUPROFEN 800 MG/1
800 TABLET, FILM COATED ORAL EVERY 8 HOURS PRN
Qty: 30 TABLET | Refills: 0 | Status: SHIPPED | OUTPATIENT
Start: 2024-09-09 | End: 2024-09-09

## 2024-09-09 ASSESSMENT — ENCOUNTER SYMPTOMS
VOMITING: 0
NAUSEA: 0

## 2024-09-09 NOTE — PROGRESS NOTES
"SUBJECTIVE:   Saundra Zuleta is a 38 year old female presenting with a chief complaint of   Chief Complaint   Patient presents with    Work Comp     Hit in the head by a cart at work today       She is a new patient of [facility].   Patient hit in back of head with cart earlier today.  No blood.  No LOC.  No other injured body parts.  Incident occurred at 12:30 AM.  States head pain.       Treatment:  ice and tylenol - helped    PMHx:  DM - noninsulin dependent  Surgeries:  \"many\"  Medications:  MV  Social:  none  Allergies:  fentanyl - hives    Review of Systems   Gastrointestinal:  Negative for nausea and vomiting.   All other systems reviewed and are negative.      No past medical history on file.  No family history on file.  No current outpatient medications on file.     Social History     Tobacco Use    Smoking status: Not on file    Smokeless tobacco: Not on file   Substance Use Topics    Alcohol use: Not on file       OBJECTIVE  /81   Pulse 68   Temp 97.9  F (36.6  C) (Oral)   Resp 14   Wt 76 kg (167 lb 9.6 oz)   SpO2 99%     Physical Exam  Vitals reviewed.   Constitutional:       Appearance: Normal appearance. She is normal weight.   HENT:      Head:        Comments: Barely palpable edema - 1 cm  Eyes:      Extraocular Movements: Extraocular movements intact.      Conjunctiva/sclera: Conjunctivae normal.   Cardiovascular:      Rate and Rhythm: Normal rate.   Neurological:      Mental Status: She is alert.         Labs:  No results found for this or any previous visit (from the past 24 hour(s)).        ASSESSMENT:      ICD-10-CM    1. Contusion of scalp, initial encounter  S00.03XA DISCONTINUED: ibuprofen (ADVIL/MOTRIN) 800 MG tablet           Medical Decision Making:    Differential Diagnosis:  contusion    Serious Comorbid Conditions:  Adult:   reviewed    PLAN:    Tylenol only.  Patient had gastric bypass and cannot take NSAIDs.  Ice.  Note to return to work.      Followup:    If not " improving or if condition worsens, follow up with your Primary Care Provider, If not improving or if conditions worsens over the next 12-24 hours, go to the Emergency Department    There are no Patient Instructions on file for this visit.

## 2024-09-09 NOTE — LETTER
September 9, 2024      Saundra Zuleta  [address] [facility] [place] [address]        To Whom It May Concern:    Saundra Zuleta was seen in our clinic. She may return to work without restrictions on 9/12/24.        Sincerely,        GÓMEZ Wong

## 2024-10-13 ENCOUNTER — HEALTH MAINTENANCE LETTER (OUTPATIENT)
Age: 39
End: 2024-10-13

## 2024-12-21 ENCOUNTER — HEALTH MAINTENANCE LETTER (OUTPATIENT)
Age: 39
End: 2024-12-21

## 2025-05-03 ENCOUNTER — HEALTH MAINTENANCE LETTER (OUTPATIENT)
Age: 40
End: 2025-05-03

## (undated) DEVICE — EVAC SYSTEM CLEAR FLOW SC082500

## (undated) DEVICE — PACK LAP CHOLE SLC15LCFSD

## (undated) DEVICE — LINEN TOWEL PACK X5 5464

## (undated) DEVICE — SU MONOCRYL 4-0 PS-2 18" UND Y496G

## (undated) DEVICE — TUBING C02 INSUFFLATION LAP FILTER HEATER 6198

## (undated) DEVICE — CLIP APPLIER ENDO 10MM M/L 176657

## (undated) DEVICE — NDL 19GA 1.5"

## (undated) DEVICE — DRSG STERI STRIP 1/2X4" R1547

## (undated) DEVICE — COVER CAMERA ENDOVIDEO

## (undated) DEVICE — SYR 50ML CATH TIP W/O NDL 309620

## (undated) DEVICE — STPL ENDO RELOAD 60MM MEDIUM THICK PURPLE EGIA60AMT

## (undated) DEVICE — SUCTION CATH 18FR ORAL TRI-FLO T62

## (undated) DEVICE — SUCTION IRR STRYKERFLOW II W/TIP 250-070-520

## (undated) DEVICE — SYSTEM CLEARIFY VISUALIZATION 21-345

## (undated) DEVICE — DRAPE LEGGINGS 8421

## (undated) DEVICE — STPL ENDO RELOAD 60MM VASCULAR MEDIUM TAN EGIA60AVM

## (undated) DEVICE — BLADE KNIFE SURG 11 371111

## (undated) DEVICE — STPL ENDO HANDLE GIA ULTRA UNIVERSAL STD EGIAUSTND

## (undated) DEVICE — ENDO TROCAR OPTICAL 12MM VERSAPORT PLUS W/FIX CAN ONB12STF

## (undated) DEVICE — DRSG BANDAID 1X3" FABRIC CURITY LATEX FREE KC44101

## (undated) DEVICE — STPL DELIVERY DEVICE ORAL ANVIL 25MM

## (undated) DEVICE — BNDG ABDOMINAL BINDER 9X62-84" 79-89210

## (undated) DEVICE — Device

## (undated) DEVICE — SOL NACL 0.9% IRRIG 1000ML BOTTLE 07138-09

## (undated) DEVICE — STPL ENDO RELOAD 45MM VASCULAR MEDIUM TAN EGIA45AVM

## (undated) DEVICE — ESU CORD MONOPOLAR 10'  E0510

## (undated) DEVICE — SU VICRYL 0 TIE 12X18" J906G

## (undated) DEVICE — ENDO TROCAR OPTICAL 12MM VERSAONE W/STD FIX CAN UNVCA12STF

## (undated) DEVICE — DRAPE BREAST/CHEST 29420

## (undated) DEVICE — GLOVE PROTEXIS BLUE W/NEU-THERA 7.5  2D73EB75

## (undated) DEVICE — SYR 03ML LL W/O NDL 309657

## (undated) DEVICE — SUCTION CANISTER MEDIVAC LINER 3000ML W/LID 65651-530

## (undated) DEVICE — APPLICATOR EVICEL RIGID TIP 35CM 3908

## (undated) DEVICE — INTRODUCER EEA STPL TRANS ANAL/ABD 25MM EEATAID25

## (undated) DEVICE — PREP CHLORAPREP 26ML TINTED ORANGE  260815

## (undated) DEVICE — ESU GROUND PAD UNIVERSAL W/O CORD

## (undated) DEVICE — ENDO POUCH GOLD 10MM ECATCH 173050G

## (undated) DEVICE — ESU DISSECTOR SONICISION CORDLESS 39CM SCD396

## (undated) DEVICE — SU SILK 2-0 SH 30" K833H

## (undated) DEVICE — SU VICRYL 3-0 SH 27" J316H

## (undated) DEVICE — ESU HOLDER LAP INST DISP PURPLE LONG 330MM H-PRO-330

## (undated) DEVICE — STPL CIRCULAR XL 25MM W/TILT TIP EEAXL25

## (undated) DEVICE — DECANTER VIAL 2006S

## (undated) DEVICE — GLOVE PROTEXIS MICRO 7.5  2D73PM75

## (undated) DEVICE — SOL NACL 0.9% IRRIG 3000ML BAG 2B7477

## (undated) RX ORDER — ONDANSETRON 2 MG/ML
INJECTION INTRAMUSCULAR; INTRAVENOUS
Status: DISPENSED
Start: 2017-03-23

## (undated) RX ORDER — DEXAMETHASONE SODIUM PHOSPHATE 4 MG/ML
INJECTION, SOLUTION INTRA-ARTICULAR; INTRALESIONAL; INTRAMUSCULAR; INTRAVENOUS; SOFT TISSUE
Status: DISPENSED
Start: 2017-03-23

## (undated) RX ORDER — KETOROLAC TROMETHAMINE 30 MG/ML
INJECTION, SOLUTION INTRAMUSCULAR; INTRAVENOUS
Status: DISPENSED
Start: 2017-03-23

## (undated) RX ORDER — BUPIVACAINE HYDROCHLORIDE AND EPINEPHRINE 2.5; 5 MG/ML; UG/ML
INJECTION, SOLUTION EPIDURAL; INFILTRATION; INTRACAUDAL; PERINEURAL
Status: DISPENSED
Start: 2017-03-23

## (undated) RX ORDER — CEFAZOLIN SODIUM 1 G/3ML
INJECTION, POWDER, FOR SOLUTION INTRAMUSCULAR; INTRAVENOUS
Status: DISPENSED
Start: 2017-03-23

## (undated) RX ORDER — FENTANYL CITRATE 50 UG/ML
INJECTION, SOLUTION INTRAMUSCULAR; INTRAVENOUS
Status: DISPENSED
Start: 2017-03-23

## (undated) RX ORDER — VECURONIUM BROMIDE 1 MG/ML
INJECTION, POWDER, LYOPHILIZED, FOR SOLUTION INTRAVENOUS
Status: DISPENSED
Start: 2017-03-23

## (undated) RX ORDER — PROPOFOL 10 MG/ML
INJECTION, EMULSION INTRAVENOUS
Status: DISPENSED
Start: 2017-03-23

## (undated) RX ORDER — HEPARIN SODIUM 5000 [USP'U]/.5ML
INJECTION, SOLUTION INTRAVENOUS; SUBCUTANEOUS
Status: DISPENSED
Start: 2017-03-23

## (undated) RX ORDER — GLYCOPYRROLATE 0.2 MG/ML
INJECTION, SOLUTION INTRAMUSCULAR; INTRAVENOUS
Status: DISPENSED
Start: 2017-03-23

## (undated) RX ORDER — LABETALOL HYDROCHLORIDE 5 MG/ML
INJECTION, SOLUTION INTRAVENOUS
Status: DISPENSED
Start: 2017-03-23

## (undated) RX ORDER — LIDOCAINE HYDROCHLORIDE 10 MG/ML
INJECTION, SOLUTION INFILTRATION; PERINEURAL
Status: DISPENSED
Start: 2017-03-23

## (undated) RX ORDER — LIDOCAINE HYDROCHLORIDE 20 MG/ML
INJECTION, SOLUTION EPIDURAL; INFILTRATION; INTRACAUDAL; PERINEURAL
Status: DISPENSED
Start: 2017-03-23

## (undated) RX ORDER — CEFAZOLIN SODIUM 1 G/50ML
SOLUTION INTRAVENOUS
Status: DISPENSED
Start: 2017-03-23